# Patient Record
Sex: FEMALE | Race: WHITE | NOT HISPANIC OR LATINO | Employment: OTHER | URBAN - METROPOLITAN AREA
[De-identification: names, ages, dates, MRNs, and addresses within clinical notes are randomized per-mention and may not be internally consistent; named-entity substitution may affect disease eponyms.]

---

## 2017-03-27 ENCOUNTER — TELEPHONE (OUTPATIENT)
Dept: NEUROSURGERY | Facility: CLINIC | Age: 52
End: 2017-03-27

## 2017-03-27 NOTE — TELEPHONE ENCOUNTER
MINERVA PT, WILL OBTAIN CD OF IMAGES AND SHE HAS THE WRITTEN REPORT. APPT MADE FOR NEXT MONDAY. APPT SLIP INT HE MAIL.

## 2017-03-27 NOTE — TELEPHONE ENCOUNTER
----- Message from Dalia Hoang sent at 3/27/2017  9:04 AM CDT -----  Contact: self  Patient request Dr Avalos patient states has brain tumor. Need appointment for this week   Please call pt at [phone]

## 2017-04-03 ENCOUNTER — OFFICE VISIT (OUTPATIENT)
Dept: NEUROSURGERY | Facility: CLINIC | Age: 52
End: 2017-04-03
Payer: COMMERCIAL

## 2017-04-03 VITALS
DIASTOLIC BLOOD PRESSURE: 78 MMHG | WEIGHT: 178.19 LBS | HEART RATE: 90 BPM | TEMPERATURE: 99 F | BODY MASS INDEX: 32.79 KG/M2 | HEIGHT: 62 IN | SYSTOLIC BLOOD PRESSURE: 115 MMHG

## 2017-04-03 DIAGNOSIS — D32.9 MENINGIOMA: Primary | ICD-10-CM

## 2017-04-03 PROCEDURE — 99204 OFFICE O/P NEW MOD 45 MIN: CPT | Mod: S$GLB,,, | Performed by: NEUROLOGICAL SURGERY

## 2017-04-03 PROCEDURE — 99999 PR PBB SHADOW E&M-EST. PATIENT-LVL III: CPT | Mod: PBBFAC,,, | Performed by: NEUROLOGICAL SURGERY

## 2017-04-03 PROCEDURE — 1160F RVW MEDS BY RX/DR IN RCRD: CPT | Mod: S$GLB,,, | Performed by: NEUROLOGICAL SURGERY

## 2017-04-03 RX ORDER — EFINACONAZOLE 100 MG/ML
SOLUTION TOPICAL
Refills: 10 | COMMUNITY
Start: 2017-01-31

## 2017-04-03 NOTE — LETTER
April 3, 2017      Helga Duvall MD  [address]  [facility] [address]           [facility] - Neurosurgery 7th Fl  [address]  [facility] [address]  Phone: [phone]          Patient: Matt Prado   MR Number: [id number]   YOB: 1965   Date of Visit: 4/3/2017       Dear Dr. Helga Duvall:    Thank you for referring Matt Prado to me for evaluation. Attached you will find relevant portions of my assessment and plan of care.    If you have questions, please do not hesitate to call me. I look forward to following Matt Prado along with you.    Sincerely,    Kedar Avalos MD    Enclosure  CC:  No Recipients    If you would like to receive this communication electronically, please contact [email or web address] or [phone] to request more information on [facility] access.    For providers and/or their staff who would like to refer a patient to [facility], please contact us through our one-stop-shop provider referral line, [facility], at [phone].    If you feel you have received this communication in error or would no longer like to receive these types of communications, please e-mail [email or web address]

## 2017-04-03 NOTE — PROGRESS NOTES
This office note has been dictated.  Matt Prado was seen in neurosurgical consultation at the office this   morning.  She is a 51-year-old lady who developed severe headache associated   with some fever and malaise last month.  She was seen in medical consultation by   Dr. Wahl in [place] and as part of her evaluation, MRI of the brain was   ordered showing a small brain tumor and she was referred for neurosurgical   evaluation.  The headaches have resolved and at this point, she has no specific   neurological complaints.  She feels that her eyesight and hearing are good.  She   has had no difficulty speaking, no weakness or numbness in extremities,   difficulty with gait or balance.  No seizures or syncopal episodes.  Past   medical history is generally benign.  She has a past history of gastroesophageal   reflux.  She is an .  She has had some issues with her practice over   the last two years and this has put extra stress on her and had her anxious and   depressed. Review of systems is otherwise negative.    On physical examination, she is a well-developed, somewhat obese white lady who   is alert and cooperative.  Examination of the head shows no tenderness over the   scalp.  Eyes show full extraocular movements.  There is no nystagmus.  Pupils   are equal and reactive to light.  Fundi show clear disk margins.  Hearing is   good bilaterally.  The neck is supple.  On neurological examination, she is   speaking clearly, provides a good history.  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  Finger-to-nose was done   well and gait is unremarkable.  Cranial nerves are otherwise intact.  She has   normal facial sensation and movement.  The tongue protrudes in the midline.  She   shows good strength in extremities, normal sensation and brisk, symmetrical   deep tendon reflexes.    MRI of the brain was done at [facility] on 03/13/17.  Ventricular size is normal.  The    brain is normally formed.  There is a small approximately 1 cm enhancing mass   arising from the dura in the central sulcus area.  This slightly depresses the   brain.  There is no associated edema.  The brain is otherwise unremarkable.    IMPRESSION:  Left parietal convexity meningioma.    RECOMMENDATIONS:  The tumor is quite small and asymptomatic.  This appears quite   benign, but the growth potential is unclear.  I would recommend we have a   followup MRI done in about one year.  If the tumor is showing any growth,   consideration can be given to excision.  I will plan to see her back then.      BRENT/CARLITOS  dd: 04/03/2017 10:29:41 (CDT)  td: 04/04/2017 01:21:49 (CDT)  Doc ID   #[id number]  Job ID #[id number]    CC: Matt Prado

## 2017-04-03 NOTE — MR AVS SNAPSHOT
[facility] - Neurosurgery 7th Fl  [address]  [place] [address]  Phone: [phone]                  Matt Prado   4/3/2017 9:45 AM   Office Visit    Description:  Female : 1965   Provider:  Kedar Avalos MD   Department:  [facility] - Neurosurgery 7th Fl           Diagnoses this Visit        Comments    Meningioma    -  Primary            To Do List           Goals (5 Years of Data)     None      Follow-Up and Disposition     Return in about 1 year (around 4/3/2018) for MRI brain.      [facility]     [facility] Nurse Care Line -  Assistance  Unless otherwise directed by your provider, please contact [facility], our nurse care line that is available for  assistance.     Registered nurses in the [facility] provide: appointment scheduling, clinical advisement, health education, and other advisory services.  Call: [phone] (toll free)               Medications           Message regarding Medications     Verify the changes and/or additions to your medication regime listed below are the same as discussed with your clinician today.  If any of these changes or additions are incorrect, please notify your healthcare provider.        STOP taking these medications     peg-electrolyte soln (TRILYTE WITH FLAVOR PACKETS) 420 gram SolR Take 4,000 mLs by mouth as directed.           Verify that the below list of medications is an accurate representation of the medications you are currently taking.  If none reported, the list may be blank. If incorrect, please contact your healthcare provider. Carry this list with you in case of emergency.           Current Medications     biotin 10 mg Tab Take by mouth.    cetirizine (ZYRTEC) 10 MG tablet Take 10 mg by mouth daily as needed.      JUBLIA 10 % Macario APPLY TO NAILS DAILY    L.acidoph-L.rhamn-B.bif-B.long 12.9 mg (2 billion cell) TbEC Take by mouth.    omeprazole (PRILOSEC) 40 MG capsule TAKE 1 CAPSULE(40 MG) BY  "MOUTH EVERY DAY    vitamin D 1000 units Tab Take 1,000 mg by mouth once daily.           Clinical Reference Information           Your Vitals Were     BP Pulse Temp Height Weight Last Period    115/78 90 98.6 °F (37 °C) (Oral) 5' 2" (1.575 m) 80.8 kg (178 lb 3.2 oz) 03/24/2017    BMI                32.59 kg/m2          Blood Pressure          Most Recent Value    BP  115/78      Allergies as of 4/3/2017     Percocet [Oxycodone-acetaminophen]    Erythromycin      Immunizations Administered on Date of Encounter - 4/3/2017     None      Orders Placed During Today's Visit     Future Labs/Procedures Expected by Expires    MRI Brain W WO Contrast  4/3/2018 4/3/2018      Language Assistance Services     ATTENTION: Language assistance services are available, free of charge. Please call [phone].      ATENCIÓN: Si adelaide astudillo, tiene a osborn disposición servicios gratuitos de asistencia lingüística. Llame al [phone].     CHÚ Ý: N?u b?n nói Ti?ng Vi?t, có các d?ch v? h? tr? ngôn ng? mi?n phí dành cho b?n. G?i s? [phone].         [facility] [address] complies with applicable Federal civil rights laws and does not discriminate on the basis of race, color, national origin, age, disability, or sex.        "

## 2018-01-26 ENCOUNTER — TELEPHONE (OUTPATIENT)
Dept: NEUROSURGERY | Facility: CLINIC | Age: 53
End: 2018-01-26

## 2018-01-26 NOTE — TELEPHONE ENCOUNTER
----- Message from Deonna Jolley sent at 1/26/2018 11:40 AM CST -----  Contact: Pt. [phone]  The patient would like to speak to someone regarding scheduling her recall appointment and MRI. Please contact the patient to discuss further.

## 2018-04-09 ENCOUNTER — OFFICE VISIT (OUTPATIENT)
Dept: NEUROSURGERY | Facility: CLINIC | Age: 53
End: 2018-04-09
Payer: COMMERCIAL

## 2018-04-09 ENCOUNTER — HOSPITAL ENCOUNTER (OUTPATIENT)
Dept: RADIOLOGY | Facility: HOSPITAL | Age: 53
Discharge: HOME | End: 2018-04-09
Attending: NEUROLOGICAL SURGERY
Payer: COMMERCIAL

## 2018-04-09 VITALS
HEIGHT: 62 IN | SYSTOLIC BLOOD PRESSURE: 145 MMHG | BODY MASS INDEX: 32.76 KG/M2 | WEIGHT: 178 LBS | HEART RATE: 94 BPM | DIASTOLIC BLOOD PRESSURE: 96 MMHG

## 2018-04-09 DIAGNOSIS — D32.9 MENINGIOMA: ICD-10-CM

## 2018-04-09 DIAGNOSIS — D32.9 MENINGIOMA: Primary | ICD-10-CM

## 2018-04-09 PROCEDURE — 70553 MRI BRAIN STEM W/O & W/DYE: CPT | Mod: 26,,, | Performed by: RADIOLOGY

## 2018-04-09 PROCEDURE — 70553 MRI BRAIN STEM W/O & W/DYE: CPT | Mod: TC

## 2018-04-09 PROCEDURE — A9585 GADOBUTROL INJECTION: HCPCS | Performed by: NEUROLOGICAL SURGERY

## 2018-04-09 PROCEDURE — 99999 PR PBB SHADOW E&M-EST. PATIENT-LVL III: CPT | Mod: PBBFAC,,, | Performed by: NEUROLOGICAL SURGERY

## 2018-04-09 PROCEDURE — 25500020 PHARM REV CODE 255: Performed by: NEUROLOGICAL SURGERY

## 2018-04-09 PROCEDURE — 99213 OFFICE O/P EST LOW 20 MIN: CPT | Mod: S$GLB,,, | Performed by: NEUROLOGICAL SURGERY

## 2018-04-09 RX ORDER — GADOBUTROL 604.72 MG/ML
8 INJECTION INTRAVENOUS
Status: COMPLETED | OUTPATIENT
Start: 2018-04-09 | End: 2018-04-09

## 2018-04-09 RX ADMIN — GADOBUTROL 8 ML: 604.72 INJECTION INTRAVENOUS at 01:04

## 2018-04-09 NOTE — PROGRESS NOTES
This office note has been dictated.  Matt Prado returned in neurosurgical followup to the office today.  She has   had occasional headaches over the past year, but nothing as severe as the   original headache that led to MRI of the brain being done.  She has otherwise   been doing well neurologically.  She has had no visual complaints, no difficulty   speaking or swallowing, no weakness or numbness in extremities, difficulty with   gait or balance.  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    On brief examination today, she is bright and alert and speaking clearly.  She   has normal extraocular movements.  The pupils are equal.  She arose easily from   the chair and walked and shows no focal neurological deficit.    MRI of the brain was repeated at [facility] today and compared to the study   done at [facility] on 03/13/17.  Again, is noted a slightly less than 1 cm convexity   meningioma near the central sulcus.  It just does touch the brain without   deformity.  There is no cerebral edema or complicating factors.  The brain is   otherwise unremarkable.    This small tumor seems quite indolent.  I believe we can wait two years before   doing a followup MRI to monitor it.  I will see her back with her study.      BRENT/CARLITOS  dd: 04/09/2018 15:15:48 (CDT)  td: 04/10/2018 11:45:03 (CDT)  Doc ID   #[id number]  Job ID #[id number]    CC: Matt Prado

## 2018-07-26 ENCOUNTER — TELEPHONE (OUTPATIENT)
Dept: ORTHOPEDICS | Facility: CLINIC | Age: 53
End: 2018-07-26

## 2018-07-26 NOTE — TELEPHONE ENCOUNTER
----- Message from Cheryl Bowling sent at 7/26/2018  8:05 AM CDT -----  Name of Who is Calling:ISAAK MCDANIELS [[id number]]      What is the request in detail: Pt has a possible broken bone in left hand and needs to be seen today.      Can the clinic reply by [facility]:   No       [facility]: [phone]

## 2018-07-26 NOTE — TELEPHONE ENCOUNTER
LM informing nothing available at this time, may want to go to urgent care, get x-rays and splint if needed.

## 2018-08-27 ENCOUNTER — OFFICE VISIT (OUTPATIENT)
Dept: OBSTETRICS AND GYNECOLOGY | Facility: CLINIC | Age: 53
End: 2018-08-27
Attending: OBSTETRICS & GYNECOLOGY
Payer: COMMERCIAL

## 2018-08-27 VITALS
DIASTOLIC BLOOD PRESSURE: 88 MMHG | HEIGHT: 62 IN | BODY MASS INDEX: 33.95 KG/M2 | SYSTOLIC BLOOD PRESSURE: 134 MMHG | WEIGHT: 184.5 LBS

## 2018-08-27 DIAGNOSIS — N95.1 PERIMENOPAUSAL: ICD-10-CM

## 2018-08-27 DIAGNOSIS — Z12.31 VISIT FOR SCREENING MAMMOGRAM: ICD-10-CM

## 2018-08-27 DIAGNOSIS — Z12.4 PAP SMEAR FOR CERVICAL CANCER SCREENING: ICD-10-CM

## 2018-08-27 DIAGNOSIS — N93.9 ABNORMAL UTERINE BLEEDING (AUB): ICD-10-CM

## 2018-08-27 DIAGNOSIS — Z01.419 WELL WOMAN EXAM WITH ROUTINE GYNECOLOGICAL EXAM: Primary | ICD-10-CM

## 2018-08-27 PROCEDURE — 99999 PR PBB SHADOW E&M-EST. PATIENT-LVL III: CPT | Mod: PBBFAC,,, | Performed by: OBSTETRICS & GYNECOLOGY

## 2018-08-27 PROCEDURE — 99396 PREV VISIT EST AGE 40-64: CPT | Mod: S$GLB,,, | Performed by: OBSTETRICS & GYNECOLOGY

## 2018-08-27 PROCEDURE — 99212 OFFICE O/P EST SF 10 MIN: CPT | Mod: 25,S$GLB,, | Performed by: OBSTETRICS & GYNECOLOGY

## 2018-08-27 PROCEDURE — 88175 CYTOPATH C/V AUTO FLUID REDO: CPT

## 2018-08-27 NOTE — PROGRESS NOTES
Matt Prado is a 53 y.o. female  who presents for annual exam.  Patient's last menstrual period was 2018 (approximate).      Past Medical History:   Diagnosis Date    Allergy     Family history of melanoma 2014    Former light tobacco smoker: < 5 per day x 10 years 2016    GERD (gastroesophageal reflux disease)     Helicobacter pylori ab+ treated 2014    Situational stress 2014    Smoker: trivial 2015       Past Surgical History:   Procedure Laterality Date    CHOLECYSTECTOMY         OB History      Para Term  AB Living    3 3       3    SAB TAB Ectopic Multiple Live Births                       ROS:  GENERAL: Feeling well overall.   SKIN: Denies rash or lesions.   HEAD: Denies head injury or headache.   NODES: Denies enlarged lymph nodes.   CHEST: Denies chest pain or shortness of breath.   CARDIOVASCULAR: Denies palpitations or left sided chest pain.   ABDOMEN: No abdominal pain, nausea, vomiting or rectal bleeding.   URINARY: No dysuria or hematuria.  REPRODUCTIVE: See HPI.   BREASTS: Denies pain, lumps, or nipple discharge.   HEMATOLOGIC: Reports excessive bleeding.   MUSCULOSKELETAL: Denies joint pain or swelling.   NEUROLOGIC: Denies syncope or weakness.   PSYCHIATRIC: Denies depression.    PE:   (chaperone present during entire exam)  APPEARANCE: Well nourished, well developed, in no acute distress.  BREASTS: Symmetrical, no skin changes or visible lesions. No palpable masses, nipple discharge or adenopathy bilaterally.  ABDOMEN: Soft. No tenderness or masses. No hernias. No CVA tenderness.  VULVA: No lesions. Normal female genitalia.  URETHRAL MEATUS: Normal size and location, no lesions, no prolapse.  URETHRA: No masses, tenderness, prolapse or scarring.  VAGINA: No lesions, no discharge, no significant cystocele or rectocele.  CERVIX: No lesions and discharge. PAP done.  UTERUS: Normal size, regular shape, mobile, non-tender, bladder base  nontender.  ADNEXA: No masses, tenderness or CDS nodularity.  ANUS PERINEUM: Normal.      Diagnosis:  1. Well woman exam with routine gynecological exam    2. Pap smear for cervical cancer screening    3. Perimenopausal    4. Visit for screening mammogram    5. Abnormal uterine bleeding (AUB)          PLAN:    Orders Placed This Encounter    Mammo Digital Screening Bilat with Tomos    US Pelvis Comp with Transvag NON-OB (xpd    Liquid-based pap smear, screening       Patient was counseled today on the need for annual gyn exams.    Follow-up in 1 year.    CC:  Abnormal bleeding    HPI:  In additional to her annual exam, she also reports that, for the past 6 months, she has noted significant changes in her menses with episodes of irregular, frequent, and prolonged bleeding.  No pelvic pain.  No fever.   We are occurring every 2-4 weeks with bleeding lasting from 3 days to 14 days.    ROS:  ABDOMEN: No abdominal pain, nausea, vomiting or rectal bleeding.   URINARY: No dysuria or hematuria.  REPRODUCTIVE: See HPI.   HEMATOLOGIC: Reports excessive bleeding.     PE:  ABDOMEN: Soft. No tenderness or masses.   VULVA: No lesions. Normal female genitalia.  VAGINA: Moist and well rugated, no abnormal discharge.  CERVIX: No lesions or discharge.   UTERUS: Normal size, regular shape, mobile, non-tender.  ADNEXA: No masses, tenderness, or CDS nodularity.    IMP:  1) Abnormal uterine bleeding  2) Perimenopausal    We discussed hypermenorrhea and the need for evaluation with pelvic ultrasound and EMBX.  We also discussed various treatment options: medical vs surgical.    PLAN:  1) Pelvic ultrasound  2) Return for EMBX

## 2018-08-28 ENCOUNTER — HOSPITAL ENCOUNTER (OUTPATIENT)
Dept: RADIOLOGY | Facility: OTHER | Age: 53
Discharge: HOME | End: 2018-08-28
Attending: OBSTETRICS & GYNECOLOGY
Payer: COMMERCIAL

## 2018-08-28 ENCOUNTER — PATIENT MESSAGE (OUTPATIENT)
Dept: OBSTETRICS AND GYNECOLOGY | Facility: CLINIC | Age: 53
End: 2018-08-28

## 2018-08-28 VITALS — BODY MASS INDEX: 33.86 KG/M2 | HEIGHT: 62 IN | WEIGHT: 184 LBS

## 2018-08-28 DIAGNOSIS — Z12.31 VISIT FOR SCREENING MAMMOGRAM: ICD-10-CM

## 2018-08-28 DIAGNOSIS — N93.9 ABNORMAL UTERINE BLEEDING (AUB): ICD-10-CM

## 2018-08-28 PROCEDURE — 76856 US EXAM PELVIC COMPLETE: CPT | Mod: 26,,, | Performed by: RADIOLOGY

## 2018-08-28 PROCEDURE — 76830 TRANSVAGINAL US NON-OB: CPT | Mod: 26,,, | Performed by: RADIOLOGY

## 2018-08-28 PROCEDURE — 76856 US EXAM PELVIC COMPLETE: CPT | Mod: TC

## 2018-08-28 PROCEDURE — 77067 SCR MAMMO BI INCL CAD: CPT | Mod: 26,,, | Performed by: RADIOLOGY

## 2018-08-28 PROCEDURE — 77063 BREAST TOMOSYNTHESIS BI: CPT | Mod: 26,,, | Performed by: RADIOLOGY

## 2018-08-28 PROCEDURE — 77063 BREAST TOMOSYNTHESIS BI: CPT | Mod: TC

## 2018-08-29 ENCOUNTER — PATIENT MESSAGE (OUTPATIENT)
Dept: OBSTETRICS AND GYNECOLOGY | Facility: CLINIC | Age: 53
End: 2018-08-29

## 2018-08-30 ENCOUNTER — PATIENT MESSAGE (OUTPATIENT)
Dept: OBSTETRICS AND GYNECOLOGY | Facility: CLINIC | Age: 53
End: 2018-08-30

## 2018-08-30 ENCOUNTER — TELEPHONE (OUTPATIENT)
Dept: OBSTETRICS AND GYNECOLOGY | Facility: CLINIC | Age: 53
End: 2018-08-30

## 2018-08-30 NOTE — TELEPHONE ENCOUNTER
Called patient:    Discussed results of pelvic ultrasound.    FINDINGS:  Uterus:  Measures 11.2 x 7.2 x 7.9 cm in transverse dimensions.  The endometrium is normal thickness measuring 8 mm.   Three uterine masses noted, suggesting fibroids.  Anterior body fibroid measuring 2.2 cm, inferior left body fibroid measuring 3.1 cm, posterior body fibroid measuring 3.8 cm (slightly distorting endometrium)..    Ovaries:  The ovaries are normal in size.  The right ovary measures 3.2 x 1.7 x 2.7 cm.  The left ovary measures  2.5 x 1.7 x 3.4 cm.  Doppler flow demonstrated to both ovaries.  Free Fluid:  None.      Impression     Mildly enlarged uterus containing multiple fibroids.       She is scheduled for EMBX 9/24/18.

## 2018-08-30 NOTE — TELEPHONE ENCOUNTER
Called patient:    Message left to call us.    [Uterus with fibroids- largest 3.8 cm.  She is scheduled for EMBX 9/24/18]

## 2018-08-31 ENCOUNTER — PATIENT MESSAGE (OUTPATIENT)
Dept: OBSTETRICS AND GYNECOLOGY | Facility: CLINIC | Age: 53
End: 2018-08-31

## 2018-09-23 NOTE — PROGRESS NOTES
CC: ENDOMETRIAL BIOPSPY    Matt Prado is a 53 y.o. female  presents for an endometrial biopsy secondary to abnormal bleeding.  For past 6 months, she has noted significant changes in her menses with episodes of irregular, frequent, and prolonged bleeding.  No pelvic pain.  No fever.   We are occurring every 2-4 weeks with bleeding lasting from 3 days to 14 days.       Pelvic sono 18:  FINDINGS:  Uterus:  Measures 11.2 x 7.2 x 7.9 cm in transverse dimensions.  The endometrium is normal thickness measuring 8 mm.   Three uterine masses noted, suggesting fibroids.  Anterior body fibroid measuring 2.2 cm, inferior left body fibroid measuring 3.1 cm, posterior body fibroid measuring 3.8 cm (slightly distorting endometrium)..  Ovaries:  The ovaries are normal in size.  The right ovary measures 3.2 x 1.7 x 2.7 cm.  The left ovary measures 2.5 x 1.7 x 3.4 cm.  Doppler flow demonstrated to both ovaries.  Free Fluid:  None.      Impression     Mildly enlarged uterus containing multiple fibroids.     Pap  18: Negative    UPT is negative    PRE ENDOMETRIAL BIOPSY COUNSELING:  The patient was informed of the risk of bleeding, infection, uterine perforation and pain and that the test will rule-out endometrial cancer with accuracy greater than 95%. She was counseled on the alternatives to endometrial biopsy and agrees to proceed.    TIME OUT PERFORMED.  The cervix was visualized with a speculum and prepped with betadine  A sterile endometrial pipelle was passed without difficulty to a depth of {NUMBERS:58808} cm.  *** endometrial tissue was obtained.  The specimen was placed in formalyn and sent to Pathology for histology evaluation. The patient tolerated the procedure well.    ASSESSMENT and PLAN    1. Abnormal uterine bleeding (AUB)          POST ENDOMETRIAL BIOPSY COUNSELING:  Manage post biopsy cramping with NSAIDs or Tylenol.  Expect spotting or light bleeding for a few days.  Report bleeding heavier  than a period, fever > 101.0 F, worsening pain or a foul smelling vaginal discharge.    FOLLOW-UP: Pending biopsy results.  We will contact her in several days to discuss biopsy results and treatment plan.

## 2018-09-24 ENCOUNTER — PROCEDURE VISIT (OUTPATIENT)
Dept: OBSTETRICS AND GYNECOLOGY | Facility: CLINIC | Age: 53
End: 2018-09-24
Attending: OBSTETRICS & GYNECOLOGY
Payer: COMMERCIAL

## 2018-09-24 VITALS
SYSTOLIC BLOOD PRESSURE: 135 MMHG | WEIGHT: 187.75 LBS | DIASTOLIC BLOOD PRESSURE: 90 MMHG | BODY MASS INDEX: 34.33 KG/M2

## 2018-09-24 DIAGNOSIS — N93.9 ABNORMAL UTERINE BLEEDING (AUB): Primary | ICD-10-CM

## 2018-09-24 DIAGNOSIS — D25.1 FIBROIDS, INTRAMURAL: ICD-10-CM

## 2018-09-24 DIAGNOSIS — N95.1 PERIMENOPAUSAL: ICD-10-CM

## 2018-09-24 PROCEDURE — 88305 TISSUE EXAM BY PATHOLOGIST: CPT | Performed by: PATHOLOGY

## 2018-09-24 PROCEDURE — 58100 BIOPSY OF UTERUS LINING: CPT | Mod: S$GLB,,, | Performed by: OBSTETRICS & GYNECOLOGY

## 2018-09-24 PROCEDURE — 88305 TISSUE EXAM BY PATHOLOGIST: CPT | Mod: 26,,, | Performed by: PATHOLOGY

## 2018-09-24 PROCEDURE — 81025 URINE PREGNANCY TEST: CPT | Mod: S$GLB,,, | Performed by: OBSTETRICS & GYNECOLOGY

## 2018-09-24 NOTE — PROCEDURES
CC: ENDOMETRIAL BIOPSPY     Matt Prado is a 53 y.o. female  presents for an endometrial biopsy secondary to abnormal bleeding.  For past 6 months, she has noted significant changes in her menses with episodes of irregular, frequent, and prolonged bleeding.  No pelvic pain.  No fever.  Now, menses are occurring every 2-4 weeks with bleeding lasting from 3 days to 14 days.  Recent pelvic sono showed several fibroids, largest 3.8 cm.  We discussed the need for EMBX to rule out endometrial pathology.     Pelvic sono 18:       FINDINGS:  Uterus:  Measures 11.2 x 7.2 x 7.9 cm in transverse dimensions.  The endometrium is normal thickness measuring 8 mm.   Three uterine masses noted, suggesting fibroids.  Anterior body fibroid measuring 2.2 cm, inferior left body fibroid measuring 3.1 cm, posterior body fibroid measuring 3.8 cm (slightly distorting endometrium)..  Ovaries:  The ovaries are normal in size.  The right ovary measures 3.2 x 1.7 x 2.7 cm.  The left ovary measures 2.5 x 1.7 x 3.4 cm.  Doppler flow demonstrated to both ovaries.  Free Fluid:  None.       Impression       Mildly enlarged uterus containing multiple fibroids.      Pap  18: Negative     UPT is negative     PRE ENDOMETRIAL BIOPSY COUNSELING:  The patient was informed of the risk of bleeding, infection, uterine perforation and pain and that the test will rule-out endometrial cancer with accuracy greater than 95%. She was counseled on the alternatives to endometrial biopsy and agrees to proceed.    TIME OUT PERFORMED.  The cervix was visualized with a speculum and prepped with betadine  A sterile endometrial pipelle was passed without difficulty to a depth of 11 cm.  Endometrial tissue was obtained.  The specimen was placed in formalyn and sent to Pathology for histology evaluation.   The patient tolerated the procedure well.      ASSESSMENT and PLAN    1. Abnormal uterine bleeding (AUB)    2.    Fibroids  3.     Perimenopausal       POST ENDOMETRIAL BIOPSY COUNSELING:  Manage post biopsy cramping with NSAIDs or Tylenol.  Expect spotting or light bleeding for a few days.  Report bleeding heavier than a period, fever > 101.0 F, worsening pain or a foul smelling vaginal discharge.    We discussed the various treatment options for abnormal bleedin) no treatment  2) medical options  3) surgical procedures.      FOLLOW-UP: Pending biopsy results.  We will contact her in several days to discuss biopsy results and treatment plan.

## 2018-10-01 ENCOUNTER — TELEPHONE (OUTPATIENT)
Dept: OBSTETRICS AND GYNECOLOGY | Facility: CLINIC | Age: 53
End: 2018-10-01

## 2018-10-01 NOTE — TELEPHONE ENCOUNTER
Called patient:    Message left to call us.    [EMBX negative - she needs to call us to discuss treatment options.]

## 2018-10-02 NOTE — TELEPHONE ENCOUNTER
Called patient:    Discussed results of EMBX from 9/24/18:    FINAL PATHOLOGIC DIAGNOSIS  Endometrial biopsy:  Dyssynchronous endometrium with breakdown;  Negative for hyperplasia or malignancy.    We reviewed the treatment options for hypermenorrhea and fibroids: We reviewed the various treatment options:  1) no treatment  2) medical treatment (OCPs, Provera, Depo-Provera,  Mirena IUD, Lysteda)  3) Radiologic embolization  4) Surgical treatment: D&C, hysteroscopy, endometrial ablation, myomectomy, hysterectomy.    She will consider her options and let us know.

## 2020-02-12 ENCOUNTER — TELEPHONE (OUTPATIENT)
Dept: NEUROSURGERY | Facility: CLINIC | Age: 55
End: 2020-02-12

## 2020-02-12 DIAGNOSIS — D32.9 MENINGIOMA: Primary | ICD-10-CM

## 2020-03-31 ENCOUNTER — TELEPHONE (OUTPATIENT)
Dept: NEUROSURGERY | Facility: CLINIC | Age: 55
End: 2020-03-31

## 2020-03-31 NOTE — TELEPHONE ENCOUNTER
----- Message from Jelena Barriga sent at 3/31/2020 11:08 AM CDT -----  Contact: pt @ [phone]  Asking to speak with someone in Dr. Avalos's office, says she was speaking with CASSIE and the call kept dropping. Please call.

## 2020-03-31 NOTE — TELEPHONE ENCOUNTER
----- Message from Grey Pereira sent at 3/31/2020 10:52 AM CDT -----  Contact: pt @ [phone]  Pt states she's returning your call

## 2020-04-06 ENCOUNTER — HOSPITAL ENCOUNTER (OUTPATIENT)
Dept: RADIOLOGY | Facility: HOSPITAL | Age: 55
Discharge: HOME | End: 2020-04-06
Attending: NEUROLOGICAL SURGERY
Payer: COMMERCIAL

## 2020-04-06 ENCOUNTER — OFFICE VISIT (OUTPATIENT)
Dept: NEUROSURGERY | Facility: CLINIC | Age: 55
End: 2020-04-06
Payer: COMMERCIAL

## 2020-04-06 DIAGNOSIS — D32.9 MENINGIOMA: ICD-10-CM

## 2020-04-06 DIAGNOSIS — D32.9 MENINGIOMA: Primary | ICD-10-CM

## 2020-04-06 PROCEDURE — 25500020 PHARM REV CODE 255: Performed by: NEUROLOGICAL SURGERY

## 2020-04-06 PROCEDURE — A9585 GADOBUTROL INJECTION: HCPCS | Performed by: NEUROLOGICAL SURGERY

## 2020-04-06 PROCEDURE — 70553 MRI BRAIN STEM W/O & W/DYE: CPT | Mod: TC

## 2020-04-06 PROCEDURE — 99213 OFFICE O/P EST LOW 20 MIN: CPT | Mod: 95,,, | Performed by: NEUROLOGICAL SURGERY

## 2020-04-06 PROCEDURE — 70553 MRI BRAIN STEM W/O & W/DYE: CPT | Mod: 26,,, | Performed by: RADIOLOGY

## 2020-04-06 PROCEDURE — 70553 MRI BRAIN W WO CONTRAST: ICD-10-PCS | Mod: 26,,, | Performed by: RADIOLOGY

## 2020-04-06 PROCEDURE — 99213 PR OFFICE/OUTPT VISIT, EST, LEVL III, 20-29 MIN: ICD-10-PCS | Mod: 95,,, | Performed by: NEUROLOGICAL SURGERY

## 2020-04-06 RX ORDER — GADOBUTROL 604.72 MG/ML
9 INJECTION INTRAVENOUS
Status: COMPLETED | OUTPATIENT
Start: 2020-04-06 | End: 2020-04-06

## 2020-04-06 RX ADMIN — GADOBUTROL 9 ML: 604.72 INJECTION INTRAVENOUS at 09:04

## 2020-04-06 NOTE — PROGRESS NOTES
Matt Prado was seen in neurosurgical follow-up by virtual visit this morning.  She has been doing well over the past 2 years with no new complaints.  Vision and speech have remained stable.  She has had no seizures or headaches.  She was specifically asked of any weakness or clumsiness in the right arm and hand as the small meningioma is adjacent to the left motor strip.  She has had no symptoms of this type.    Examination by virtual visit was limited.  She is alert and cooperative and speaking clearly.  She had no difficulty holding her arms out, there was no evidence for any weakness or pronator drift.    MRI of the brain was repeated at [facility] today and compared to her previous study of 04/09/2018.  Again is seen the small left convexity meningioma which just does the touch the left precentral gyrus.  There is no evidence for edema or reaction in the brain.  The tumor is the same size on both studies.    This has proved to be a quite stable lesion.  We will plan to have follow-up MRI done in about 2 years.  If she develops any new symptoms prior to that time she should call the office.

## 2020-05-08 ENCOUNTER — HOSPITAL ENCOUNTER (EMERGENCY)
Facility: HOSPITAL | Age: 55
Discharge: HOME | End: 2020-05-08
Attending: EMERGENCY MEDICINE
Payer: COMMERCIAL

## 2020-05-08 VITALS
WEIGHT: 165 LBS | BODY MASS INDEX: 29.23 KG/M2 | HEIGHT: 63 IN | TEMPERATURE: 98 F | SYSTOLIC BLOOD PRESSURE: 121 MMHG | RESPIRATION RATE: 14 BRPM | HEART RATE: 88 BPM | DIASTOLIC BLOOD PRESSURE: 77 MMHG | OXYGEN SATURATION: 98 %

## 2020-05-08 DIAGNOSIS — R11.2 NON-INTRACTABLE VOMITING WITH NAUSEA, UNSPECIFIED VOMITING TYPE: ICD-10-CM

## 2020-05-08 DIAGNOSIS — R53.1 WEAKNESS: Primary | ICD-10-CM

## 2020-05-08 DIAGNOSIS — D72.829 LEUKOCYTOSIS: ICD-10-CM

## 2020-05-08 LAB
ALBUMIN SERPL BCP-MCNC: 4.1 G/DL (ref 3.5–5.2)
ALP SERPL-CCNC: 69 U/L (ref 55–135)
ALT SERPL W/O P-5'-P-CCNC: 22 U/L (ref 10–44)
ANION GAP SERPL CALC-SCNC: 9 MMOL/L (ref 8–16)
AST SERPL-CCNC: 20 U/L (ref 10–40)
BACTERIA #/AREA URNS HPF: NORMAL /HPF
BASOPHILS # BLD AUTO: 0.03 K/UL (ref 0–0.2)
BASOPHILS NFR BLD: 0.2 % (ref 0–1.9)
BILIRUB SERPL-MCNC: 0.7 MG/DL (ref 0.1–1)
BILIRUB UR QL STRIP: NEGATIVE
BUN SERPL-MCNC: 16 MG/DL (ref 6–20)
CALCIUM SERPL-MCNC: 9.3 MG/DL (ref 8.7–10.5)
CHLORIDE SERPL-SCNC: 101 MMOL/L (ref 95–110)
CLARITY UR: CLEAR
CO2 SERPL-SCNC: 27 MMOL/L (ref 23–29)
COLOR UR: YELLOW
CREAT SERPL-MCNC: 0.8 MG/DL (ref 0.5–1.4)
DIFFERENTIAL METHOD: ABNORMAL
EOSINOPHIL # BLD AUTO: 0.1 K/UL (ref 0–0.5)
EOSINOPHIL NFR BLD: 0.7 % (ref 0–8)
ERYTHROCYTE [DISTWIDTH] IN BLOOD BY AUTOMATED COUNT: 14.5 % (ref 11.5–14.5)
EST. GFR  (AFRICAN AMERICAN): >60 ML/MIN/1.73 M^2
EST. GFR  (NON AFRICAN AMERICAN): >60 ML/MIN/1.73 M^2
GLUCOSE SERPL-MCNC: 116 MG/DL (ref 70–110)
GLUCOSE UR QL STRIP: NEGATIVE
HCT VFR BLD AUTO: 40.5 % (ref 37–48.5)
HGB BLD-MCNC: 13 G/DL (ref 12–16)
HGB UR QL STRIP: NEGATIVE
HYALINE CASTS #/AREA URNS LPF: 0 /LPF
IMM GRANULOCYTES # BLD AUTO: 0.15 K/UL (ref 0–0.04)
IMM GRANULOCYTES NFR BLD AUTO: 0.9 % (ref 0–0.5)
KETONES UR QL STRIP: ABNORMAL
LACTATE SERPL-SCNC: 1.3 MMOL/L (ref 0.5–2.2)
LEUKOCYTE ESTERASE UR QL STRIP: ABNORMAL
LIPASE SERPL-CCNC: 17 U/L (ref 4–60)
LYMPHOCYTES # BLD AUTO: 0.7 K/UL (ref 1–4.8)
LYMPHOCYTES NFR BLD: 4.4 % (ref 18–48)
MCH RBC QN AUTO: 26.6 PG (ref 27–31)
MCHC RBC AUTO-ENTMCNC: 32.1 G/DL (ref 32–36)
MCV RBC AUTO: 83 FL (ref 82–98)
MICROSCOPIC COMMENT: NORMAL
MONOCYTES # BLD AUTO: 1.4 K/UL (ref 0.3–1)
MONOCYTES NFR BLD: 8.6 % (ref 4–15)
NEUTROPHILS # BLD AUTO: 14.2 K/UL (ref 1.8–7.7)
NEUTROPHILS NFR BLD: 85.2 % (ref 38–73)
NITRITE UR QL STRIP: NEGATIVE
NRBC BLD-RTO: 0 /100 WBC
PH UR STRIP: >8 [PH] (ref 5–8)
PLATELET # BLD AUTO: 199 K/UL (ref 150–350)
PMV BLD AUTO: 9.8 FL (ref 9.2–12.9)
POTASSIUM SERPL-SCNC: 3.5 MMOL/L (ref 3.5–5.1)
PROCALCITONIN SERPL IA-MCNC: 0.41 NG/ML
PROT SERPL-MCNC: 7.2 G/DL (ref 6–8.4)
PROT UR QL STRIP: ABNORMAL
RBC # BLD AUTO: 4.89 M/UL (ref 4–5.4)
RBC #/AREA URNS HPF: 0 /HPF (ref 0–4)
SARS-COV-2 RDRP RESP QL NAA+PROBE: NEGATIVE
SODIUM SERPL-SCNC: 137 MMOL/L (ref 136–145)
SP GR UR STRIP: 1.01 (ref 1–1.03)
SQUAMOUS #/AREA URNS HPF: 6 /HPF
URN SPEC COLLECT METH UR: ABNORMAL
UROBILINOGEN UR STRIP-ACNC: NEGATIVE EU/DL
WBC # BLD AUTO: 16.71 K/UL (ref 3.9–12.7)
WBC #/AREA URNS HPF: 2 /HPF (ref 0–5)

## 2020-05-08 PROCEDURE — 99284 EMERGENCY DEPT VISIT MOD MDM: CPT | Mod: 25

## 2020-05-08 PROCEDURE — 96361 HYDRATE IV INFUSION ADD-ON: CPT

## 2020-05-08 PROCEDURE — 80053 COMPREHEN METABOLIC PANEL: CPT

## 2020-05-08 PROCEDURE — 96374 THER/PROPH/DIAG INJ IV PUSH: CPT

## 2020-05-08 PROCEDURE — 83605 ASSAY OF LACTIC ACID: CPT

## 2020-05-08 PROCEDURE — 63600175 PHARM REV CODE 636 W HCPCS: Performed by: PHYSICIAN ASSISTANT

## 2020-05-08 PROCEDURE — 85025 COMPLETE CBC W/AUTO DIFF WBC: CPT

## 2020-05-08 PROCEDURE — 83690 ASSAY OF LIPASE: CPT

## 2020-05-08 PROCEDURE — 81000 URINALYSIS NONAUTO W/SCOPE: CPT

## 2020-05-08 PROCEDURE — U0002 COVID-19 LAB TEST NON-CDC: HCPCS

## 2020-05-08 PROCEDURE — 25000003 PHARM REV CODE 250: Performed by: PHYSICIAN ASSISTANT

## 2020-05-08 PROCEDURE — 87040 BLOOD CULTURE FOR BACTERIA: CPT | Mod: 59

## 2020-05-08 PROCEDURE — 84145 PROCALCITONIN (PCT): CPT

## 2020-05-08 RX ORDER — ONDANSETRON 2 MG/ML
4 INJECTION INTRAMUSCULAR; INTRAVENOUS
Status: COMPLETED | OUTPATIENT
Start: 2020-05-08 | End: 2020-05-08

## 2020-05-08 RX ORDER — ONDANSETRON 4 MG/1
4 TABLET, ORALLY DISINTEGRATING ORAL EVERY 6 HOURS PRN
Qty: 30 TABLET | Refills: 0 | Status: SHIPPED | OUTPATIENT
Start: 2020-05-08

## 2020-05-08 RX ADMIN — ONDANSETRON 4 MG: 2 INJECTION INTRAMUSCULAR; INTRAVENOUS at 11:05

## 2020-05-08 RX ADMIN — SODIUM CHLORIDE 500 ML: 0.9 INJECTION, SOLUTION INTRAVENOUS at 11:05

## 2020-05-08 NOTE — ED TRIAGE NOTES
Pt reports after going to [facility] testing site where she states she became weak and feeling nauseous. PT states she does not understand why she is feeling that way. PT states she has been having body aches, chills, nausea and vomiting. PT in no acute distress.

## 2020-05-08 NOTE — ED PROVIDER NOTES
Encounter Date: 5/8/2020       History     Chief Complaint   Patient presents with    Fatigue     pt was at the [facility] testing  site for n/v and became weak. pt was given 250cc saline en route. cbg was 123mg/dl     Matt Prado, a 54 y.o. female  has a past medical history of Allergy, Family history of melanoma (1/23/2014), Former light tobacco smoker: < 5 per day x 10 years (5/24/2016), GERD (gastroesophageal reflux disease), Helicobacter pylori ab+ treated 2007 (1/24/2014), Situational stress (1/24/2014), and Smoker: trivial (2/27/2015).     She presents to the ED evaluation of episode of weakness.  Patients states that she started with chills and subjective fever overnight; called on-call number and was instructed to go to [facility] testing site.  Continued with vomiting x 5 times today.  While standing in line at site, states that she felt weak and like she was going to pass out.  EMS called.  CBG was 123.  Patient states prior to episode, had taken Aleve and tylenol.  Denies abdominal pain or diarrhea.        The history is provided by the patient.     Review of patient's allergies indicates:   Allergen Reactions    Percocet [oxycodone-acetaminophen] Itching     C/o of vomiting and itching    Erythromycin Other (See Comments)     Stomach pain     Past Medical History:   Diagnosis Date    Allergy     Family history of melanoma 1/23/2014    Former light tobacco smoker: < 5 per day x 10 years 5/24/2016    GERD (gastroesophageal reflux disease)     Helicobacter pylori ab+ treated 2007 1/24/2014    Situational stress 1/24/2014    Smoker: trivial 2/27/2015     Past Surgical History:   Procedure Laterality Date    CHOLECYSTECTOMY      COLONOSCOPY N/A 9/13/2016    Procedure: COLONOSCOPY;  Surgeon: Dionisio Meier MD;  Location: [address]);  Service: Endoscopy;  Laterality: N/A;  combined order to be done with EGD      Family History   Problem Relation Age of Onset    Breast cancer Maternal  Grandmother     Cancer Maternal Grandmother     Cancer Father         melanoma    Multiple sclerosis Father     Cancer Paternal Grandmother     Cancer Mother         Skin cancer    Arthritis Mother     Skin cancer Mother     Heart disease Neg Hx     Colon cancer Neg Hx     Ovarian cancer Neg Hx     Diabetes Neg Hx     Hypertension Neg Hx      Social History     Tobacco Use    Smoking status: Former Smoker     Packs/day: 0.25     Years: 10.00     Pack years: 2.50     Types: Cigarettes     Last attempt to quit: 2016     Years since quittin.3    Smokeless tobacco: Never Used   Substance Use Topics    Alcohol use: Yes     Alcohol/week: 2.0 standard drinks     Types: 2 Glasses of wine per week     Comment: 2-4  glasses every night    Drug use: No     Review of Systems   Constitutional: Positive for chills and fever.   Respiratory: Negative for cough and shortness of breath.    Cardiovascular: Negative for chest pain.   Gastrointestinal: Positive for nausea and vomiting. Negative for abdominal pain and diarrhea.   Allergic/Immunologic: Negative for immunocompromised state.   Neurological: Positive for weakness.       Physical Exam     Initial Vitals   BP Pulse Resp Temp SpO2   20 1133 20 1133 20 1134 20 1134 20 1133   114/68 85 14 98.1 °F (36.7 °C) 98 %      MAP       --                Physical Exam    Nursing note and vitals reviewed.  Constitutional: She appears well-developed and well-nourished. She is not diaphoretic. No distress.   HENT:   Head: Normocephalic and atraumatic.   Right Ear: External ear normal.   Left Ear: External ear normal.   Nose: Nose normal.   Eyes: Conjunctivae and EOM are normal.   Neck: Normal range of motion.   Cardiovascular: Normal rate and regular rhythm.   Pulmonary/Chest: Breath sounds normal. No respiratory distress. She has no wheezes. She has no rhonchi. She has no rales.   Abdominal: Soft. Bowel sounds are normal. She exhibits no  distension. There is no tenderness. There is no rebound and no guarding.   Musculoskeletal: Normal range of motion.   Neurological: She is alert and oriented to person, place, and time.   Skin: Skin is warm and dry. Capillary refill takes less than 2 seconds. No rash noted.   Psychiatric: She has a normal mood and affect. Thought content normal.         ED Course   Procedures  Labs Reviewed   CBC W/ AUTO DIFFERENTIAL   COMPREHENSIVE METABOLIC PANEL   LIPASE   URINALYSIS, REFLEX TO URINE CULTURE   SARS-COV-2 RNA AMPLIFICATION, QUAL          Imaging Results    None          Medical Decision Making:   Initial Assessment:   Weakness, nausea, vomiting  Differential Diagnosis:   Electrolyte abnormality, COVID, dehydration  Clinical Tests:   Lab Tests: Ordered and Reviewed  The following lab test(s) were unremarkable: CBC, CMP and Lipase  Radiological Study: Ordered and Reviewed  ED Management:  To the ER for evaluation of episode of weakness, fever, N/V and concern for corona virus.  No significant abnormalities found on physical exam.  Fluids and Zofran were administered.  White blood cell [place] at [address].  Blood cultures were drawn out of an abundance of caution.  She has tolerated p.o. and is feeling improvement with these interventions.  She was instructed to return with any new or worsening symptoms.  She verbalized understanding and agreement with plan.                                 Clinical Impression:       ICD-10-CM ICD-9-CM   1. Weakness R53.1 780.79   2. Leukocytosis D72.829 288.60   3. Non-intractable vomiting with nausea, unspecified vomiting type R11.2 787.01                                Haylie Rodriguez PA-C  05/08/20 [id number]

## 2020-05-12 LAB
BACTERIA BLD CULT: NORMAL
BACTERIA BLD CULT: NORMAL

## 2020-10-24 ENCOUNTER — IMMUNIZATION (OUTPATIENT)
Dept: INTERNAL MEDICINE | Facility: CLINIC | Age: 55
End: 2020-10-24
Payer: COMMERCIAL

## 2020-10-24 PROCEDURE — 90686 FLU VACCINE (QUAD) GREATER THAN OR EQUAL TO 3YO PRESERVATIVE FREE IM: ICD-10-PCS | Mod: S$GLB,,, | Performed by: INTERNAL MEDICINE

## 2020-10-24 PROCEDURE — 90686 IIV4 VACC NO PRSV 0.5 ML IM: CPT | Mod: S$GLB,,, | Performed by: INTERNAL MEDICINE

## 2020-10-24 PROCEDURE — 90471 IMMUNIZATION ADMIN: CPT | Mod: S$GLB,,, | Performed by: INTERNAL MEDICINE

## 2020-10-24 PROCEDURE — 90471 FLU VACCINE (QUAD) GREATER THAN OR EQUAL TO 3YO PRESERVATIVE FREE IM: ICD-10-PCS | Mod: S$GLB,,, | Performed by: INTERNAL MEDICINE

## 2020-11-12 ENCOUNTER — OFFICE VISIT (OUTPATIENT)
Dept: URGENT CARE | Facility: CLINIC | Age: 55
End: 2020-11-12
Payer: COMMERCIAL

## 2020-11-12 ENCOUNTER — TELEPHONE (OUTPATIENT)
Dept: INTERNAL MEDICINE | Facility: CLINIC | Age: 55
End: 2020-11-12

## 2020-11-12 VITALS
OXYGEN SATURATION: 98 % | WEIGHT: 170 LBS | BODY MASS INDEX: 30.12 KG/M2 | SYSTOLIC BLOOD PRESSURE: 144 MMHG | RESPIRATION RATE: 16 BRPM | TEMPERATURE: 101 F | HEART RATE: 90 BPM | DIASTOLIC BLOOD PRESSURE: 102 MMHG | HEIGHT: 63 IN

## 2020-11-12 DIAGNOSIS — R03.0 SINGLE EPISODE OF ELEVATED BLOOD PRESSURE: ICD-10-CM

## 2020-11-12 DIAGNOSIS — Z12.31 SCREENING MAMMOGRAM, ENCOUNTER FOR: ICD-10-CM

## 2020-11-12 DIAGNOSIS — Z00.00 ANNUAL PHYSICAL EXAM: Primary | ICD-10-CM

## 2020-11-12 DIAGNOSIS — R50.9 FEVER, UNSPECIFIED FEVER CAUSE: Primary | ICD-10-CM

## 2020-11-12 LAB
CTP QC/QA: YES
CTP QC/QA: YES
FLUAV AG NPH QL: NEGATIVE
FLUBV AG NPH QL: NEGATIVE
SARS-COV-2 RDRP RESP QL NAA+PROBE: NEGATIVE

## 2020-11-12 PROCEDURE — U0002 COVID-19 LAB TEST NON-CDC: HCPCS | Mod: QW,S$GLB,, | Performed by: NURSE PRACTITIONER

## 2020-11-12 PROCEDURE — 87804 POCT INFLUENZA A/B: ICD-10-PCS | Mod: 59,QW,S$GLB, | Performed by: NURSE PRACTITIONER

## 2020-11-12 PROCEDURE — U0002: ICD-10-PCS | Mod: QW,S$GLB,, | Performed by: NURSE PRACTITIONER

## 2020-11-12 PROCEDURE — 3008F BODY MASS INDEX DOCD: CPT | Mod: CPTII,S$GLB,, | Performed by: NURSE PRACTITIONER

## 2020-11-12 PROCEDURE — 87804 INFLUENZA ASSAY W/OPTIC: CPT | Mod: QW,S$GLB,, | Performed by: NURSE PRACTITIONER

## 2020-11-12 PROCEDURE — 99214 PR OFFICE/OUTPT VISIT, EST, LEVL IV, 30-39 MIN: ICD-10-PCS | Mod: 25,S$GLB,, | Performed by: NURSE PRACTITIONER

## 2020-11-12 PROCEDURE — 3008F PR BODY MASS INDEX (BMI) DOCUMENTED: ICD-10-PCS | Mod: CPTII,S$GLB,, | Performed by: NURSE PRACTITIONER

## 2020-11-12 PROCEDURE — 99214 OFFICE O/P EST MOD 30 MIN: CPT | Mod: 25,S$GLB,, | Performed by: NURSE PRACTITIONER

## 2020-11-12 NOTE — TELEPHONE ENCOUNTER
Spoke with pt in detail. She will be going out of town next week until the 1st week of December. She will call then for all appts.    Awilda

## 2020-11-12 NOTE — TELEPHONE ENCOUNTER
Please call    I have not seen her since 2016    Please schedule an EPP, labs prior    She is due for mammogram also    Orders in for all thanks

## 2020-11-12 NOTE — PATIENT INSTRUCTIONS
"Patient Instructions      Please follow up with your Primary care provider within 2-5 days if your signs and symptoms have not resolved or worsen.  The usual course of cold symptoms are 10-14 days.      If your condition worsens or fails to improve we recommend that you receive another evaluation at the emergency room immediately or contact your primary medical clinic to discuss your concerns.      You must understand that you have received an Urgent Care treatment only and that you may be released before all of your medical problems are known or treated.   You, the patient, will arrange for follow up care as instructed.      Tylenol or Ibuprofen can also be used as directed for pain/fever unless you have an allergy to them or medical condition such as stomach ulcers, kidney or liver disease or blood thinners etc for which you should not be taking these type of medications.      Take over the counter cough medication as directed as needed for cough.  You should avoid medications with pseudoephedrine or phenylephrine (any medication with "D") if you have high blood pressure as this can cause an elevation in your blood pressure. Instead consider Corcidin HBP as needed to prevent an elevated blood pressure.      Natural remedies of symptoms (as needed) include humidification, saline nasal sprays, and/or steamy showers.  Increase fluids, warm tea with honey, cough drops as needed.  You may also use salt water gargles for sore throat.     IF you received a steroid shot today - As discussed, this can elevate your blood pressure, elevate your blood sugar, water weight gain, nervous energy, redness to the face and dimpling of the skin at the injection site.      You have tested negative for COVID-19 today.  If you did not have any close exposure as defined below, then effective today, you can return to your normal daily activities including social distancing, wearing masks, and frequent handwashing.     A close exposure is " defined as anyone who had a masked or an unmasked exposure to a known COVID -19 positive person, at less than 6 ft for more than 15 minutes.  If your exposure meets this definition, then you are required to quarantine for 14 days per the CDC.     The 14 day quarantine begins from the day you were exposed, not the day of your test.  For example, if your exposure was on a Monday, and you waited until Friday of the same week to get tested and it was negative, your 14 day quarantine begins from that Monday, not the Friday you tested negative.     If you developed symptoms since the exposure, and your test was negative today, you still have to quarantine for 14 days from the date of the exposure.     So if you meet the definition of a close exposure, A NEGATIVE TEST DOES NOT GET YOU OUT OF 14 DAYS OF QUARANTINE!     OVER THE COUNTER RECOMMENDATIONS/SUGGESTIONS.    Make sure to stay well hydrated.    Use Nasal Saline to mechanically move any post nasal drip from your eustachian tube or from the back of your throat.    Use warm salt water gargles to ease your throat pain. Warm salt water gargles as needed for sore throat-  1/2 tsp salt to 1 cup warm water, gargle as desired.    Use an antihistamine such as Claritin, Zyrtec or Allegra to dry you out.     Use pseudoephedrine (behind the counter) to decongest. Pseudoephedrine  30 mg up to 240 mg /day. It can raise your blood pressure and give you palpitations.    Use mucinex (guaifenisin) to break up mucous up to 2400mg/day to loosen any mucous.   The mucinex DM pill has a cough suppressant that can be sedating. It can be used at night to stop the tickle at the back of your throat.  You can use Mucinex D (it has guaifenesin and a high dose of pseudoephedrine) in the mornings to help decongest.      Use Afrin in each nare for no longer than 3 days, as it is addictive. It can also dry out your mucous membranes and cause elevated blood pressure. This is especially useful if you  are flying.    Use Flonase 1-2 sprays/nostril per day. It is a local acting steroid nasal spray, if you develop a bloody nose, stop using the medication immediately.    Sometimes Nyquil at night is beneficial to help you get some rest, however it is sedating and it does have an antihistamine, and tylenol.    Honey is a natural cough suppressant that can be used.    Tylenol up to 4,000 mg a day is safe for short periods and can be used for body aches, pain, and fever. However in high doses and prolonged use it can cause liver irritation.    Ibuprofen is a non-steroidal anti-inflammatory that can be used for body aches, pain, and fever.However it can also cause stomach irritation if over used.

## 2020-11-12 NOTE — PROGRESS NOTES
"Subjective:       Patient ID: Matt Prado is a 55 y.o. female.    Vitals:  height is 5' 3" (1.6 m) and weight is 77.1 kg (170 lb). Her tympanic temperature is 101.2 °F (38.4 °C) (abnormal). Her blood pressure is 144/102 (abnormal) and her pulse is 90. Her respiration is 16 and oxygen saturation is 98%.     Chief Complaint: Nausea, Fever, and COVID-19 Concerns    Pt c/o fever (101.2), headache, and nausea since yesterday. She does not know of any known contact with covid but works in the [employer]. She denies sinus pressure. She denies cough. She is taking otc medication for her symptoms.     Nausea  This is a new problem. The problem occurs constantly. The problem has been unchanged. Associated symptoms include a fever, headaches and nausea. Pertinent negatives include no arthralgias, chest pain, chills, congestion, coughing, diaphoresis, fatigue, joint swelling, myalgias, rash, sore throat, vertigo, vomiting or weakness. Nothing aggravates the symptoms. She has tried nothing for the symptoms.   Fever   This is a new problem. The current episode started yesterday. The problem occurs constantly. The problem has been unchanged. The temperature was taken using an oral thermometer. Associated symptoms include headaches and nausea. Pertinent negatives include no chest pain, congestion, coughing, diarrhea, ear pain, rash, sore throat, urinary pain, vomiting or wheezing. She has tried nothing for the symptoms.       Constitution: Positive for fever. Negative for chills, sweating and fatigue.   HENT: Negative for ear pain, congestion, sinus pain, sinus pressure, sore throat and voice change.    Neck: Negative for painful lymph nodes.   Cardiovascular: Negative for chest pain and leg swelling.   Eyes: Negative for eye redness, double vision and blurred vision.   Respiratory: Negative for chest tightness, cough, sputum production, bloody sputum, COPD, shortness of breath, stridor, wheezing and asthma.  "   Gastrointestinal: Positive for nausea. Negative for vomiting and diarrhea.   Genitourinary: Negative for dysuria, frequency, urgency and history of kidney stones.   Musculoskeletal: Negative for joint pain, joint swelling, muscle cramps and muscle ache.   Skin: Negative for color change, pale, rash and bruising.   Allergic/Immunologic: Negative for seasonal allergies and asthma.   Neurological: Positive for headaches. Negative for dizziness, history of vertigo, light-headedness and passing out.   Hematologic/Lymphatic: Negative for swollen lymph nodes.   Psychiatric/Behavioral: Negative for nervous/anxious, sleep disturbance and depression. The patient is not nervous/anxious.        Objective:      Physical Exam   Constitutional: She is oriented to person, place, and time. She appears well-developed. She is cooperative.  Non-toxic appearance. She does not appear ill. No distress.   HENT:   Head: Normocephalic and atraumatic.   Ears:   Right Ear: Hearing, tympanic membrane, external ear and ear canal normal.   Left Ear: Hearing, tympanic membrane, external ear and ear canal normal.   Nose: Nose normal. No mucosal edema, rhinorrhea or nasal deformity. No epistaxis. Right sinus exhibits no maxillary sinus tenderness and no frontal sinus tenderness. Left sinus exhibits no maxillary sinus tenderness and no frontal sinus tenderness.   Mouth/Throat: Uvula is midline, oropharynx is clear and moist and mucous membranes are normal. No trismus in the jaw. Normal dentition. No uvula swelling. No oropharyngeal exudate, posterior oropharyngeal edema or posterior oropharyngeal erythema.   Eyes: Conjunctivae and lids are normal. No scleral icterus.   Neck: Trachea normal, full passive range of motion without pain and phonation normal. Neck supple. No neck rigidity. No edema and no erythema present.   Cardiovascular: Normal rate, regular rhythm, normal heart sounds and normal pulses.   Pulmonary/Chest: Effort normal and breath  sounds normal. No respiratory distress. She has no decreased breath sounds. She has no rhonchi.   Abdominal: Normal appearance.   Musculoskeletal: Normal range of motion.         General: No deformity.   Neurological: She is alert and oriented to person, place, and time. She exhibits normal muscle tone. Coordination normal.   Skin: Skin is warm, dry, intact, not diaphoretic and not pale. Psychiatric: Her speech is normal and behavior is normal. Judgment and thought content normal.   Nursing note and vitals reviewed.    Results for orders placed or performed in visit on 11/12/20   POCT COVID-19 Rapid Screening   Result Value Ref Range    POC Rapid COVID Negative Negative     Acceptable Yes    POCT Influenza A/B   Result Value Ref Range    Rapid Influenza A Ag Negative Negative    Rapid Influenza B Ag Negative Negative     Acceptable Yes            Assessment:       1. Fever, unspecified fever cause    2. Single episode of elevated blood pressure        Plan:         Fever, unspecified fever cause  -     POCT COVID-19 Rapid Screening  -     POCT Influenza A/B    Single episode of elevated blood pressure

## 2021-04-05 ENCOUNTER — PATIENT MESSAGE (OUTPATIENT)
Dept: RESEARCH | Facility: HOSPITAL | Age: 56
End: 2021-04-05

## 2021-08-27 ENCOUNTER — PATIENT MESSAGE (OUTPATIENT)
Dept: INTERNAL MEDICINE | Facility: CLINIC | Age: 56
End: 2021-08-27

## 2021-08-27 ENCOUNTER — OFFICE VISIT (OUTPATIENT)
Dept: URGENT CARE | Facility: CLINIC | Age: 56
End: 2021-08-27
Payer: COMMERCIAL

## 2021-08-27 VITALS
BODY MASS INDEX: 30.12 KG/M2 | HEIGHT: 63 IN | RESPIRATION RATE: 18 BRPM | TEMPERATURE: 99 F | WEIGHT: 170 LBS | HEART RATE: 88 BPM | OXYGEN SATURATION: 98 % | SYSTOLIC BLOOD PRESSURE: 154 MMHG | DIASTOLIC BLOOD PRESSURE: 106 MMHG

## 2021-08-27 DIAGNOSIS — R03.0 ELEVATED BLOOD PRESSURE READING: ICD-10-CM

## 2021-08-27 DIAGNOSIS — S91.332A PUNCTURE WOUND OF LEFT FOOT EXCLUDING TOES WITHOUT COMPLICATION, INITIAL ENCOUNTER: Primary | ICD-10-CM

## 2021-08-27 PROCEDURE — 1159F MED LIST DOCD IN RCRD: CPT | Mod: CPTII,S$GLB,, | Performed by: STUDENT IN AN ORGANIZED HEALTH CARE EDUCATION/TRAINING PROGRAM

## 2021-08-27 PROCEDURE — 3008F BODY MASS INDEX DOCD: CPT | Mod: CPTII,S$GLB,, | Performed by: STUDENT IN AN ORGANIZED HEALTH CARE EDUCATION/TRAINING PROGRAM

## 2021-08-27 PROCEDURE — 1160F PR REVIEW ALL MEDS BY PRESCRIBER/CLIN PHARMACIST DOCUMENTED: ICD-10-PCS | Mod: CPTII,S$GLB,, | Performed by: STUDENT IN AN ORGANIZED HEALTH CARE EDUCATION/TRAINING PROGRAM

## 2021-08-27 PROCEDURE — 99213 OFFICE O/P EST LOW 20 MIN: CPT | Mod: 25,S$GLB,, | Performed by: STUDENT IN AN ORGANIZED HEALTH CARE EDUCATION/TRAINING PROGRAM

## 2021-08-27 PROCEDURE — 3080F PR MOST RECENT DIASTOLIC BLOOD PRESSURE >= 90 MM HG: ICD-10-PCS | Mod: CPTII,S$GLB,, | Performed by: STUDENT IN AN ORGANIZED HEALTH CARE EDUCATION/TRAINING PROGRAM

## 2021-08-27 PROCEDURE — 1160F RVW MEDS BY RX/DR IN RCRD: CPT | Mod: CPTII,S$GLB,, | Performed by: STUDENT IN AN ORGANIZED HEALTH CARE EDUCATION/TRAINING PROGRAM

## 2021-08-27 PROCEDURE — 3080F DIAST BP >= 90 MM HG: CPT | Mod: CPTII,S$GLB,, | Performed by: STUDENT IN AN ORGANIZED HEALTH CARE EDUCATION/TRAINING PROGRAM

## 2021-08-27 PROCEDURE — 90715 TDAP VACCINE GREATER THAN OR EQUAL TO 7YO IM: ICD-10-PCS | Mod: S$GLB,,, | Performed by: STUDENT IN AN ORGANIZED HEALTH CARE EDUCATION/TRAINING PROGRAM

## 2021-08-27 PROCEDURE — 90471 IMMUNIZATION ADMIN: CPT | Mod: S$GLB,,, | Performed by: STUDENT IN AN ORGANIZED HEALTH CARE EDUCATION/TRAINING PROGRAM

## 2021-08-27 PROCEDURE — 99213 PR OFFICE/OUTPT VISIT, EST, LEVL III, 20-29 MIN: ICD-10-PCS | Mod: 25,S$GLB,, | Performed by: STUDENT IN AN ORGANIZED HEALTH CARE EDUCATION/TRAINING PROGRAM

## 2021-08-27 PROCEDURE — 3077F SYST BP >= 140 MM HG: CPT | Mod: CPTII,S$GLB,, | Performed by: STUDENT IN AN ORGANIZED HEALTH CARE EDUCATION/TRAINING PROGRAM

## 2021-08-27 PROCEDURE — 90715 TDAP VACCINE 7 YRS/> IM: CPT | Mod: S$GLB,,, | Performed by: STUDENT IN AN ORGANIZED HEALTH CARE EDUCATION/TRAINING PROGRAM

## 2021-08-27 PROCEDURE — 1159F PR MEDICATION LIST DOCUMENTED IN MEDICAL RECORD: ICD-10-PCS | Mod: CPTII,S$GLB,, | Performed by: STUDENT IN AN ORGANIZED HEALTH CARE EDUCATION/TRAINING PROGRAM

## 2021-08-27 PROCEDURE — 90471 TDAP VACCINE GREATER THAN OR EQUAL TO 7YO IM: ICD-10-PCS | Mod: S$GLB,,, | Performed by: STUDENT IN AN ORGANIZED HEALTH CARE EDUCATION/TRAINING PROGRAM

## 2021-08-27 PROCEDURE — 3077F PR MOST RECENT SYSTOLIC BLOOD PRESSURE >= 140 MM HG: ICD-10-PCS | Mod: CPTII,S$GLB,, | Performed by: STUDENT IN AN ORGANIZED HEALTH CARE EDUCATION/TRAINING PROGRAM

## 2021-08-27 PROCEDURE — 3008F PR BODY MASS INDEX (BMI) DOCUMENTED: ICD-10-PCS | Mod: CPTII,S$GLB,, | Performed by: STUDENT IN AN ORGANIZED HEALTH CARE EDUCATION/TRAINING PROGRAM

## 2021-08-27 RX ORDER — LEVOFLOXACIN 500 MG/1
500 TABLET, FILM COATED ORAL DAILY
Qty: 5 TABLET | Refills: 0 | Status: SHIPPED | OUTPATIENT
Start: 2021-08-27 | End: 2021-09-01

## 2021-08-27 RX ORDER — CEPHALEXIN 500 MG/1
500 CAPSULE ORAL EVERY 8 HOURS
Qty: 15 CAPSULE | Refills: 0 | Status: SHIPPED | OUTPATIENT
Start: 2021-08-27 | End: 2021-09-01

## 2021-11-30 ENCOUNTER — HOSPITAL ENCOUNTER (OUTPATIENT)
Dept: RADIOLOGY | Facility: HOSPITAL | Age: 56
Discharge: HOME | End: 2021-11-30
Attending: ORTHOPAEDIC SURGERY
Payer: COMMERCIAL

## 2021-11-30 ENCOUNTER — OFFICE VISIT (OUTPATIENT)
Dept: ORTHOPEDICS | Facility: CLINIC | Age: 56
End: 2021-11-30
Payer: COMMERCIAL

## 2021-11-30 VITALS — BODY MASS INDEX: 31.89 KG/M2 | WEIGHT: 180 LBS | HEIGHT: 63 IN

## 2021-11-30 DIAGNOSIS — M25.572 LEFT ANKLE PAIN, UNSPECIFIED CHRONICITY: ICD-10-CM

## 2021-11-30 DIAGNOSIS — M76.822 POSTERIOR TIBIAL TENDONITIS, LEFT: Primary | ICD-10-CM

## 2021-11-30 PROCEDURE — 99204 PR OFFICE/OUTPT VISIT, NEW, LEVL IV, 45-59 MIN: ICD-10-PCS | Mod: S$GLB,,, | Performed by: ORTHOPAEDIC SURGERY

## 2021-11-30 PROCEDURE — 99999 PR PBB SHADOW E&M-EST. PATIENT-LVL III: CPT | Mod: PBBFAC,,, | Performed by: ORTHOPAEDIC SURGERY

## 2021-11-30 PROCEDURE — 73610 XR ANKLE COMPLETE 3 VIEW LEFT: ICD-10-PCS | Mod: 26,LT,, | Performed by: RADIOLOGY

## 2021-11-30 PROCEDURE — 73610 X-RAY EXAM OF ANKLE: CPT | Mod: 26,LT,, | Performed by: RADIOLOGY

## 2021-11-30 PROCEDURE — 73610 X-RAY EXAM OF ANKLE: CPT | Mod: TC,LT

## 2021-11-30 PROCEDURE — 99999 PR PBB SHADOW E&M-EST. PATIENT-LVL III: ICD-10-PCS | Mod: PBBFAC,,, | Performed by: ORTHOPAEDIC SURGERY

## 2021-11-30 PROCEDURE — 99204 OFFICE O/P NEW MOD 45 MIN: CPT | Mod: S$GLB,,, | Performed by: ORTHOPAEDIC SURGERY

## 2022-02-23 ENCOUNTER — PATIENT MESSAGE (OUTPATIENT)
Dept: ORTHOPEDICS | Facility: CLINIC | Age: 57
End: 2022-02-23
Payer: COMMERCIAL

## 2022-03-07 ENCOUNTER — OFFICE VISIT (OUTPATIENT)
Dept: ORTHOPEDICS | Facility: CLINIC | Age: 57
End: 2022-03-07
Payer: COMMERCIAL

## 2022-03-07 VITALS — HEIGHT: 63 IN | WEIGHT: 179.88 LBS | BODY MASS INDEX: 31.87 KG/M2

## 2022-03-07 DIAGNOSIS — M76.822 POSTERIOR TIBIAL TENDONITIS, LEFT: Primary | ICD-10-CM

## 2022-03-07 PROCEDURE — 1159F MED LIST DOCD IN RCRD: CPT | Mod: CPTII,S$GLB,, | Performed by: ORTHOPAEDIC SURGERY

## 2022-03-07 PROCEDURE — 99214 PR OFFICE/OUTPT VISIT, EST, LEVL IV, 30-39 MIN: ICD-10-PCS | Mod: S$GLB,,, | Performed by: ORTHOPAEDIC SURGERY

## 2022-03-07 PROCEDURE — 1159F PR MEDICATION LIST DOCUMENTED IN MEDICAL RECORD: ICD-10-PCS | Mod: CPTII,S$GLB,, | Performed by: ORTHOPAEDIC SURGERY

## 2022-03-07 PROCEDURE — 3008F BODY MASS INDEX DOCD: CPT | Mod: CPTII,S$GLB,, | Performed by: ORTHOPAEDIC SURGERY

## 2022-03-07 PROCEDURE — 3008F PR BODY MASS INDEX (BMI) DOCUMENTED: ICD-10-PCS | Mod: CPTII,S$GLB,, | Performed by: ORTHOPAEDIC SURGERY

## 2022-03-07 PROCEDURE — 99999 PR PBB SHADOW E&M-EST. PATIENT-LVL III: CPT | Mod: PBBFAC,,, | Performed by: ORTHOPAEDIC SURGERY

## 2022-03-07 PROCEDURE — 99214 OFFICE O/P EST MOD 30 MIN: CPT | Mod: S$GLB,,, | Performed by: ORTHOPAEDIC SURGERY

## 2022-03-07 PROCEDURE — 99999 PR PBB SHADOW E&M-EST. PATIENT-LVL III: ICD-10-PCS | Mod: PBBFAC,,, | Performed by: ORTHOPAEDIC SURGERY

## 2022-03-07 RX ORDER — METHYLPREDNISOLONE 4 MG/1
TABLET ORAL
Qty: 21 EACH | Refills: 0 | Status: SHIPPED | OUTPATIENT
Start: 2022-03-07 | End: 2022-03-28

## 2022-03-07 NOTE — PROGRESS NOTES
"Subjective:      Patient ID: Matt Prado is a 56 y.o. female.    Chief Complaint: Pain of the Left Ankle      HPI:  This is a 56-year-old female who is established in the clinic but is new to me.  She was seen in November by Dr. Wooten for left posterior tibial tendinitis and was treated with conservative measures including a ankle brace and activity modification.  She comes in for evaluation reports that she is actually doing much better but is going out of the country in April and wanted to be re-evaluated again.  She reports some continued mild medial-sided hindfoot pain with prolonged activities.  She does not report any significant swelling.      Current Outpatient Medications:     biotin 10 mg Tab, Take by mouth., Disp: , Rfl:     cetirizine (ZYRTEC) 10 MG tablet, Take 10 mg by mouth daily as needed., Disp: , Rfl:     JUBLIA 10 % Macario, APPLY TO NAILS DAILY, Disp: , Rfl: 10    L.acidoph-L.rhamn-B.bif-B.long 12.9 mg (2 billion cell) TbEC, Take by mouth., Disp: , Rfl:     omeprazole (PRILOSEC) 40 MG capsule, TAKE 1 CAPSULE(40 MG) BY MOUTH EVERY DAY, Disp: 30 capsule, Rfl: 0    ondansetron (ZOFRAN-ODT) 4 MG TbDL, Take 1 tablet (4 mg total) by mouth every 6 (six) hours as needed., Disp: 30 tablet, Rfl: 0    vitamin D 1000 units Tab, Take 1,000 mg by mouth once daily., Disp: , Rfl:   Review of patient's allergies indicates:   Allergen Reactions    Percocet [oxycodone-acetaminophen] Itching     C/o of vomiting and itching    Erythromycin Other (See Comments)     Stomach pain       Ht 5' 3" (1.6 m)   Wt 81.6 kg (179 lb 14.3 oz)   BMI 31.87 kg/m²     ROS:  Negative for chest pain, shortness of breath, fevers, or unexplained weight loss.      Objective:    Ortho Exam       This is a well-developed well-nourished female who walks in with a normal gait.  On standing inspection she has bilateral pes planus alignment of her feet.  She is able to do a double limb heel rise with minimal discomfort medially on " the left side.  On sitting exam she has mild tenderness along the course of the posterior tibial tendon but has full motion of her ankle and subtalar joint and good functional strength of the posterior tibial tendon.  She is neurovascularly intact.      Assessment:     Imaging:  No new images were obtained        1. Posterior tibial tendonitis, left               Plan:          Recommendation:  It is encouraging that her symptoms have improved with conservative measures.  I suggested a course of physical therapy prior to her trip and I also suggested she obtain some better over-the-counter orthotics for her trip.  I also prescribed a Medrol Dosepak that she can use if necessary on her trip.    Follow-up in 3 months if necessary

## 2022-12-06 ENCOUNTER — OFFICE VISIT (OUTPATIENT)
Dept: SPORTS MEDICINE | Facility: CLINIC | Age: 57
End: 2022-12-06
Payer: COMMERCIAL

## 2022-12-06 ENCOUNTER — HOSPITAL ENCOUNTER (OUTPATIENT)
Dept: RADIOLOGY | Facility: HOSPITAL | Age: 57
Discharge: HOME | End: 2022-12-06
Attending: ORTHOPAEDIC SURGERY
Payer: COMMERCIAL

## 2022-12-06 VITALS
SYSTOLIC BLOOD PRESSURE: 144 MMHG | HEART RATE: 96 BPM | BODY MASS INDEX: 31.71 KG/M2 | WEIGHT: 179 LBS | HEIGHT: 63 IN | DIASTOLIC BLOOD PRESSURE: 94 MMHG

## 2022-12-06 DIAGNOSIS — M94.261 CHONDROMALACIA OF RIGHT KNEE: Primary | ICD-10-CM

## 2022-12-06 DIAGNOSIS — M25.569 KNEE PAIN, UNSPECIFIED CHRONICITY, UNSPECIFIED LATERALITY: ICD-10-CM

## 2022-12-06 DIAGNOSIS — M25.561 ACUTE PAIN OF RIGHT KNEE: ICD-10-CM

## 2022-12-06 PROCEDURE — 99999 PR PBB SHADOW E&M-EST. PATIENT-LVL III: CPT | Mod: PBBFAC,,, | Performed by: ORTHOPAEDIC SURGERY

## 2022-12-06 PROCEDURE — 3080F DIAST BP >= 90 MM HG: CPT | Mod: CPTII,S$GLB,, | Performed by: ORTHOPAEDIC SURGERY

## 2022-12-06 PROCEDURE — 73564 X-RAY EXAM KNEE 4 OR MORE: CPT | Mod: 26,,, | Performed by: RADIOLOGY

## 2022-12-06 PROCEDURE — 99999 PR PBB SHADOW E&M-EST. PATIENT-LVL III: ICD-10-PCS | Mod: PBBFAC,,, | Performed by: ORTHOPAEDIC SURGERY

## 2022-12-06 PROCEDURE — 3077F SYST BP >= 140 MM HG: CPT | Mod: CPTII,S$GLB,, | Performed by: ORTHOPAEDIC SURGERY

## 2022-12-06 PROCEDURE — 73564 XR KNEE ORTHO BILAT WITH FLEXION: ICD-10-PCS | Mod: 26,,, | Performed by: RADIOLOGY

## 2022-12-06 PROCEDURE — 99204 OFFICE O/P NEW MOD 45 MIN: CPT | Mod: 25,S$GLB,, | Performed by: ORTHOPAEDIC SURGERY

## 2022-12-06 PROCEDURE — 3077F PR MOST RECENT SYSTOLIC BLOOD PRESSURE >= 140 MM HG: ICD-10-PCS | Mod: CPTII,S$GLB,, | Performed by: ORTHOPAEDIC SURGERY

## 2022-12-06 PROCEDURE — 3080F PR MOST RECENT DIASTOLIC BLOOD PRESSURE >= 90 MM HG: ICD-10-PCS | Mod: CPTII,S$GLB,, | Performed by: ORTHOPAEDIC SURGERY

## 2022-12-06 PROCEDURE — 73564 X-RAY EXAM KNEE 4 OR MORE: CPT | Mod: TC,50

## 2022-12-06 PROCEDURE — 1159F MED LIST DOCD IN RCRD: CPT | Mod: CPTII,S$GLB,, | Performed by: ORTHOPAEDIC SURGERY

## 2022-12-06 PROCEDURE — 1159F PR MEDICATION LIST DOCUMENTED IN MEDICAL RECORD: ICD-10-PCS | Mod: CPTII,S$GLB,, | Performed by: ORTHOPAEDIC SURGERY

## 2022-12-06 PROCEDURE — 20610 LARGE JOINT ASPIRATION/INJECTION: R KNEE: ICD-10-PCS | Mod: RT,S$GLB,, | Performed by: ORTHOPAEDIC SURGERY

## 2022-12-06 PROCEDURE — 3008F BODY MASS INDEX DOCD: CPT | Mod: CPTII,S$GLB,, | Performed by: ORTHOPAEDIC SURGERY

## 2022-12-06 PROCEDURE — 99204 PR OFFICE/OUTPT VISIT, NEW, LEVL IV, 45-59 MIN: ICD-10-PCS | Mod: 25,S$GLB,, | Performed by: ORTHOPAEDIC SURGERY

## 2022-12-06 PROCEDURE — 20610 DRAIN/INJ JOINT/BURSA W/O US: CPT | Mod: RT,S$GLB,, | Performed by: ORTHOPAEDIC SURGERY

## 2022-12-06 PROCEDURE — 3008F PR BODY MASS INDEX (BMI) DOCUMENTED: ICD-10-PCS | Mod: CPTII,S$GLB,, | Performed by: ORTHOPAEDIC SURGERY

## 2022-12-06 RX ORDER — CELECOXIB 200 MG/1
200 CAPSULE ORAL DAILY
Qty: 30 CAPSULE | Refills: 0 | Status: SHIPPED | OUTPATIENT
Start: 2022-12-06 | End: 2023-01-05

## 2022-12-06 RX ORDER — TRIAMCINOLONE ACETONIDE 40 MG/ML
40 INJECTION, SUSPENSION INTRA-ARTICULAR; INTRAMUSCULAR
Status: DISCONTINUED | OUTPATIENT
Start: 2022-12-06 | End: 2022-12-06 | Stop reason: HOSPADM

## 2022-12-06 RX ADMIN — TRIAMCINOLONE ACETONIDE 40 MG: 40 INJECTION, SUSPENSION INTRA-ARTICULAR; INTRAMUSCULAR at 02:12

## 2022-12-06 NOTE — PROGRESS NOTES
CC: Right knee pain    57 y.o. Female who presents as a new patient to me. She works as a .  She reports an approximate 2 week history of insidious onset knee pain.  Localized anterolaterally.  Denies any specific antecedent injury event although she does associated symptom onset to a [place]..  She does states that yesterday when she was getting up from her desk and she felt a sharp pain.  She describes associated mechanical symptoms.  Symptoms worse with getting up out of a chair, going up and down stairs. 7-8/10 in severity. She states she started to walk with a limp and she presents to clinic today with a knee sleeve.  Denies swelling or effusions. Better with rest. Treatment thus far has included rest, activity modifications, oral medications and a knee sleeve. Here today to discuss diagnosis and treatment options.      Pain Score:   5    REVIEW OF SYSTEMS:   Constitution: Negative. Negative for chills, fever and night sweats.    Hematologic/Lymphatic: Negative for bleeding problem. Does not bruise/bleed easily.   Skin: Negative for dry skin, itching and rash.   Musculoskeletal: Negative for falls. Positive for right knee pain and muscle weakness.     All other review of symptoms were reviewed and found to be noncontributory.     PAST MEDICAL HISTORY:   Past Medical History:   Diagnosis Date    Allergy     Family history of melanoma 1/23/2014    Former light tobacco smoker: < 5 per day x 10 years 5/24/2016    GERD (gastroesophageal reflux disease)     Helicobacter pylori ab+ treated 2007 1/24/2014    Situational stress 1/24/2014    Smoker: trivial 2/27/2015     PAST SURGICAL HISTORY:   Past Surgical History:   Procedure Laterality Date    CHOLECYSTECTOMY      COLONOSCOPY N/A 9/13/2016    Procedure: COLONOSCOPY;  Surgeon: Dionisio Meier MD;  Location: [facility] ([address] [place] [address]);  Service: Endoscopy;  Laterality: N/A;  combined order to be done with EGD       FAMILY HISTORY:   Family History   Problem Relation Age of Onset    Breast cancer Maternal Grandmother     Cancer Maternal Grandmother     Cancer Father         melanoma    Multiple sclerosis Father     Cancer Paternal Grandmother     Cancer Mother         Skin cancer    Arthritis Mother     Skin cancer Mother     Heart disease Neg Hx     Colon cancer Neg Hx     Ovarian cancer Neg Hx     Diabetes Neg Hx     Hypertension Neg Hx      SOCIAL HISTORY:   Social History     Socioeconomic History    Marital status:     Number of children: 3   Tobacco Use    Smoking status: Former     Packs/day: 0.25     Years: 10.00     Pack years: 2.50     Types: Cigarettes     Quit date: 2016     Years since quittin.9    Smokeless tobacco: Never   Substance and Sexual Activity    Alcohol use: Yes     Alcohol/week: 2.0 standard drinks     Types: 2 Glasses of wine per week     Comment: 2-4  glasses every night    Drug use: No    Sexual activity: Yes     Partners: Male     Birth control/protection: None     MEDICATIONS:     Current Outpatient Medications:     biotin 10 mg Tab, Take by mouth., Disp: , Rfl:     cetirizine (ZYRTEC) 10 MG tablet, Take 10 mg by mouth daily as needed., Disp: , Rfl:     JUBLIA 10 % Macario, APPLY TO NAILS DAILY, Disp: , Rfl: 10    L.acidoph-L.rhamn-B.bif-B.long 12.9 mg (2 billion cell) TbEC, Take by mouth., Disp: , Rfl:     omeprazole (PRILOSEC) 40 MG capsule, TAKE 1 CAPSULE(40 MG) BY MOUTH EVERY DAY, Disp: 30 capsule, Rfl: 0    ondansetron (ZOFRAN-ODT) 4 MG TbDL, Take 1 tablet (4 mg total) by mouth every 6 (six) hours as needed., Disp: 30 tablet, Rfl: 0    vitamin D 1000 units Tab, Take 1,000 mg by mouth once daily., Disp: , Rfl:     ALLERGIES:   Review of patient's allergies indicates:   Allergen Reactions    Percocet [oxycodone-acetaminophen] Itching     C/o of vomiting and itching    Erythromycin Other (See Comments)     Stomach pain      PHYSICAL EXAMINATION:  BP  "(!) 144/94   Pulse 96   Ht 5' 3" (1.6 m)   Wt 81.2 kg (179 lb)   BMI 31.71 kg/m²   General: Well-developed well-nourished 57 y.o. femalein no acute distress   Cardiovascular: Regular rhythm by palpation of distal pulse, normal color and temperature, no concerning varicosities on symptomatic side   Lungs: No labored breathing or wheezing appreciated   Neuro: Alert and oriented ×3   Psychiatric: well oriented to person, place and time, demonstrates normal mood and affect   Skin: No rashes, lesions or ulcers, normal temperature, turgor, and texture on involved extremity    Ortho/SPM Exam  Examination of the right knee demonstrates intact extensor mechanism. No effusion or prepatellar swelling. Central patellar tracking.  Positive patellar crepitus and grind. Full extension. Flexion to 130 with pain anteriorly on forced flexion. Mild tenderness along the lateral joint line.  Pain to palpation over the patellar facets.  Negative Maida's for medial or lateral joint specific pain. Negative Lachman. Stable to varus/valgus stress testing at 0 and 30 deg. Negative posterior drawer. Ligamentously stable.      IMAGING:  X-rays including standing, weight bearing AP and flexion bilateral knees, RIGHT knee lateral and sunrise views ordered and images reviewed by me show:    Mild marginal spur formation indicative of a degenerative process.  Well-maintained joint spaces otherwise.    ASSESSMENT:      ICD-10-CM ICD-9-CM   1. Chondromalacia of right knee  M94.261 717.7   2. Acute pain of right knee  M25.561 719.46       PLAN:     Findings discussed with the patient.  Mostly anterior/retropatellar knee pain associated with bent knee activities.  Provoked with a day of mound hiking.  On exam she has patellofemoral crepitus with pain over the anterior knee with provocative maneuvers.  Most consistent with symptomatic chondromalacia.  Initial conservative treatment recommended.  Corticosteroid injection given.  Physical therapy " referral to [facility].  Celebrex prescription given.  We will see how she does with that.  If pain is persistent or recurrent despite these measures, next step would be MRI.      Large Joint Aspiration/Injection: R knee    Date/Time: 12/6/2022 2:00 PM  Performed by: YOANNA Roberts MD  Authorized by: YOANNA Roberts MD     Consent Done?:  Yes (Verbal)  Indications:  Pain  Site marked: the procedure site was marked    Timeout: prior to procedure the correct patient, procedure, and site was verified    Prep: patient was prepped and draped in usual sterile fashion      Local anesthesia used?: Yes    Local anesthetic:  Co-phenylcaine spray (0.2% Naropin)  Anesthetic total (ml):  4      Details:  Needle Size:  22 G  Ultrasonic Guidance for needle placement?: No    Approach:  Lateral  Location:  Knee  Site:  R knee  Medications:  40 mg triamcinolone acetonide 40 mg/mL  Patient tolerance:  Patient tolerated the procedure well with no immediate complications

## 2023-04-25 ENCOUNTER — TELEPHONE (OUTPATIENT)
Dept: INTERNAL MEDICINE | Facility: CLINIC | Age: 58
End: 2023-04-25
Payer: COMMERCIAL

## 2023-04-25 DIAGNOSIS — Z12.31 VISIT FOR SCREENING MAMMOGRAM: Primary | ICD-10-CM

## 2023-04-25 NOTE — TELEPHONE ENCOUNTER
Please call her, I have not seen her since 2016.  Does she still consider me her primary physician?  Please schedule if so    If not, please remove her from the PCP field    She is due for labs, mammogram, Pap smear

## 2023-04-25 NOTE — TELEPHONE ENCOUNTER
----- Message from Herlinda Wayne sent at 4/25/2023  1:52 PM CDT -----  Type: Patient Call Back         Who called: Pt          What is the request in detail: Pt called in regarding requesting orders for a Mammo gram          Can the clinic reply by [facility]?no          Would the patient rather a call back or a response via [facility]? Call back          Best call back number: [phone] (mobile)          Additional Information:           Thank You

## 2023-05-10 ENCOUNTER — APPOINTMENT (OUTPATIENT)
Dept: RADIOLOGY | Facility: OTHER | Age: 58
End: 2023-05-10
Attending: INTERNAL MEDICINE
Payer: COMMERCIAL

## 2023-05-10 VITALS — BODY MASS INDEX: 31.71 KG/M2 | WEIGHT: 179 LBS | HEIGHT: 63 IN

## 2023-05-10 DIAGNOSIS — Z12.31 VISIT FOR SCREENING MAMMOGRAM: ICD-10-CM

## 2023-05-10 PROCEDURE — 77067 MAMMO DIGITAL SCREENING BILAT WITH TOMO: ICD-10-PCS | Mod: 26,,, | Performed by: RADIOLOGY

## 2023-05-10 PROCEDURE — 77067 SCR MAMMO BI INCL CAD: CPT | Mod: TC,PN

## 2023-05-10 PROCEDURE — 77063 BREAST TOMOSYNTHESIS BI: CPT | Mod: 26,,, | Performed by: RADIOLOGY

## 2023-05-10 PROCEDURE — 77067 SCR MAMMO BI INCL CAD: CPT | Mod: 26,,, | Performed by: RADIOLOGY

## 2023-05-10 PROCEDURE — 77063 MAMMO DIGITAL SCREENING BILAT WITH TOMO: ICD-10-PCS | Mod: 26,,, | Performed by: RADIOLOGY

## 2023-07-03 ENCOUNTER — PATIENT MESSAGE (OUTPATIENT)
Dept: INTERNAL MEDICINE | Facility: CLINIC | Age: 58
End: 2023-07-03
Payer: COMMERCIAL

## 2023-07-03 ENCOUNTER — TELEPHONE (OUTPATIENT)
Dept: INTERNAL MEDICINE | Facility: CLINIC | Age: 58
End: 2023-07-03
Payer: COMMERCIAL

## 2023-07-03 NOTE — TELEPHONE ENCOUNTER
She has not answered our phone calls.    Letter sent today as well as Willie message    I have not seen her for years.  I am no longer PCP    I signed the mammogram order    PLEASE REMOVE HER FROM ANY WOG LISTS AND ANY AUTOMATIC ORDERS THANKS    LB

## 2023-07-03 NOTE — LETTER
July 3, 2023    Matt VALVERDE Eve  [address] [place] [address]             [facility]ikhericka [facility]  [address]  [place] [address]  Phone: [phone]  Fax: [phone] Dear Mrs. Prado:    Your health and well-being are very important to us.    We have tried to reach you on several occasions to schedule your routine annual exam along with labs and health maintenance but have not been able to speak to you.    I did order your mammogram this year, and it was acceptable.    However, since I have not seen you since 2016 I will not be able to continue to order any testing or medication without you coming in for an office visit.        Sincerely,        Helga Duvall MD, FACP

## 2023-09-08 ENCOUNTER — TELEPHONE (OUTPATIENT)
Dept: ORTHOPEDICS | Facility: CLINIC | Age: 58
End: 2023-09-08
Payer: COMMERCIAL

## 2023-09-08 DIAGNOSIS — M79.642 LEFT HAND PAIN: Primary | ICD-10-CM

## 2023-09-11 ENCOUNTER — HOSPITAL ENCOUNTER (OUTPATIENT)
Dept: RADIOLOGY | Facility: OTHER | Age: 58
Discharge: HOME | End: 2023-09-11
Attending: PHYSICIAN ASSISTANT
Payer: COMMERCIAL

## 2023-09-11 ENCOUNTER — OFFICE VISIT (OUTPATIENT)
Dept: ORTHOPEDICS | Facility: CLINIC | Age: 58
End: 2023-09-11
Payer: COMMERCIAL

## 2023-09-11 DIAGNOSIS — M18.12 ARTHRITIS OF CARPOMETACARPAL (CMC) JOINT OF LEFT THUMB: Primary | ICD-10-CM

## 2023-09-11 DIAGNOSIS — M79.642 LEFT HAND PAIN: ICD-10-CM

## 2023-09-11 PROCEDURE — 1159F MED LIST DOCD IN RCRD: CPT | Mod: CPTII,S$GLB,, | Performed by: PHYSICIAN ASSISTANT

## 2023-09-11 PROCEDURE — 1159F PR MEDICATION LIST DOCUMENTED IN MEDICAL RECORD: ICD-10-PCS | Mod: CPTII,S$GLB,, | Performed by: PHYSICIAN ASSISTANT

## 2023-09-11 PROCEDURE — 99999 PR PBB SHADOW E&M-EST. PATIENT-LVL II: ICD-10-PCS | Mod: PBBFAC,,, | Performed by: PHYSICIAN ASSISTANT

## 2023-09-11 PROCEDURE — 99999 PR PBB SHADOW E&M-EST. PATIENT-LVL II: CPT | Mod: PBBFAC,,, | Performed by: PHYSICIAN ASSISTANT

## 2023-09-11 PROCEDURE — 73130 X-RAY EXAM OF HAND: CPT | Mod: TC,FY,LT

## 2023-09-11 PROCEDURE — 73130 X-RAY EXAM OF HAND: CPT | Mod: 26,LT,, | Performed by: RADIOLOGY

## 2023-09-11 PROCEDURE — 99203 OFFICE O/P NEW LOW 30 MIN: CPT | Mod: S$GLB,,, | Performed by: PHYSICIAN ASSISTANT

## 2023-09-11 PROCEDURE — 99203 PR OFFICE/OUTPT VISIT, NEW, LEVL III, 30-44 MIN: ICD-10-PCS | Mod: S$GLB,,, | Performed by: PHYSICIAN ASSISTANT

## 2023-09-11 PROCEDURE — 73130 XR HAND COMPLETE 3 VIEW LEFT: ICD-10-PCS | Mod: 26,LT,, | Performed by: RADIOLOGY

## 2023-09-11 NOTE — PROGRESS NOTES
[facility]  History & Physical  Orthopedics    SUBJECTIVE:      Chief Complaint: Left thumb pain    Referring Provider: Maynor Nur Dr. is the supervising physician for this encounter/patient    History of Present Illness:  Patient is a 58 y.o. right hand dominant female who presents today with complaints of left thumb pain, off/on for several years, but has been more progressive for about 6 months. History of thumb injury several years ago, no fractures at that time. Pain located to the base/pad of the thumb, worse with gripping/twisting. 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 No surgical history on the hands.     Onset of symptoms/DOI was progressive for 6 months.    Symptoms are aggravated by activity and movement.    Symptoms are alleviated by rest.    Symptoms consist of pain.    The patient rates their pain as a 4/10.    Attempted treatment(s) and/or interventions include activity modifications, rest.     The patient denies any fevers, chills, N/V, D/C and presents for evaluation.       Past Medical History:   Diagnosis Date    Allergy     Family history of melanoma 1/23/2014    Former light tobacco smoker: < 5 per day x 10 years 5/24/2016    GERD (gastroesophageal reflux disease)     Helicobacter pylori ab+ treated 2007 1/24/2014    Situational stress 1/24/2014    Smoker: trivial 2/27/2015     Past Surgical History:   Procedure Laterality Date    CHOLECYSTECTOMY      COLONOSCOPY N/A 9/13/2016    Procedure: COLONOSCOPY;  Surgeon: Dionisio Meier MD;  Location: [address]);  Service: Endoscopy;  Laterality: N/A;  combined order to be done with EGD      Review of patient's allergies indicates:   Allergen Reactions    Percocet [oxycodone-acetaminophen] Itching     C/o of vomiting and itching    Erythromycin Other (See Comments)     Stomach pain     Social History     Social History Narrative    Not on file     Family History   Problem  Relation Age of Onset    Cancer Mother         Skin cancer    Arthritis Mother     Skin cancer Mother     Cancer Father         melanoma    Multiple sclerosis Father     Cancer Maternal Grandmother     Breast cancer Paternal Grandmother     Cancer Paternal Grandmother     Heart disease Neg Hx     Colon cancer Neg Hx     Ovarian cancer Neg Hx     Diabetes Neg Hx     Hypertension Neg Hx          Current Outpatient Medications:     biotin 10 mg Tab, Take by mouth., Disp: , Rfl:     celecoxib (CELEBREX) 200 MG capsule, TAKE 1 CAPSULE(200 MG) BY MOUTH EVERY DAY, Disp: 30 capsule, Rfl: 0    cetirizine (ZYRTEC) 10 MG tablet, Take 10 mg by mouth daily as needed., Disp: , Rfl:     JUBLIA 10 % Macario, APPLY TO NAILS DAILY, Disp: , Rfl: 10    L.acidoph-L.rhamn-B.bif-B.long 12.9 mg (2 billion cell) TbEC, Take by mouth., Disp: , Rfl:     omeprazole (PRILOSEC) 40 MG capsule, TAKE 1 CAPSULE(40 MG) BY MOUTH EVERY DAY, Disp: 30 capsule, Rfl: 0    ondansetron (ZOFRAN-ODT) 4 MG TbDL, Take 1 tablet (4 mg total) by mouth every 6 (six) hours as needed., Disp: 30 tablet, Rfl: 0    vitamin D 1000 units Tab, Take 1,000 mg by mouth once daily., Disp: , Rfl:       Review of Systems:  Constitutional: no fever or chills  Eyes: no visual changes  ENT: no nasal congestion or sore throat  Respiratory: no cough or shortness of breath  Cardiovascular: no chest pain  Gastrointestinal: no nausea or vomiting, tolerating diet  Musculoskeletal: pain and soreness    OBJECTIVE:      Vital Signs (Most Recent):  There were no vitals filed for this visit.  There is no height or weight on file to calculate BMI.      Physical Exam:  Constitutional: The patient appears well-developed and well-nourished. No distress.   Skin: No lesions appreciated  Head: Normocephalic and atraumatic.   Nose: Nose normal.   Ears: No deformities seen  Eyes: Conjunctivae and EOM are normal.   Neck: No tracheal deviation present.   Cardiovascular: Normal rate and intact distal pulses.     Pulmonary/Chest: Effort normal. No respiratory distress.   Abdominal: There is no guarding.   Neurological: The patient is alert.   Psychiatric: The patient has a normal mood and affect.     Left Hand/Wrist Examination:    Observation/Inspection:  Swelling  none    Deformity  none  Discoloration  none     Scars   none    Atrophy  none    HAND/WRIST EXAMINATION:  Finkelstein's Test   Neg  WHAT Test    Neg  Snuff box tenderness   Neg  Henderson's Test    Neg  Hook of Hamate Tenderness  Neg  CMC grind    POS  Circumduction test   Mod pain  TTP over the CMC. NTTP at the A1 pulley or radial styloid    Neurovascular Exam:  Digits WWP, brisk CR < 3s throughout  NVI motor/LTS to M/R/U nerves, radial pulse 2+  Tinel's Test - Carpal Tunnel  Neg  Tinel's Test - Cubital Tunnel  Neg  Phalen's Test    Neg  Median Nerve Compression Test Neg    ROM hand full, painless    ROM wrist full, painless    ROM elbow full, painless    Abdomen not guarded  Respirations nonlabored  Perfusion intact    Diagnostic Results:     Imaging - I independently viewed the patient's imaging as well as the radiology report.      FINDINGS:  The alignment is within normal limits.  No displaced fractures identified.  No evidence of lytic or blastic lesions.Degenerative changes 1st carpometacarpal joint with joint space narrowing subchondral sclerosis and heterotopic ossicles.Soft tissues are unremarkable.     Impression:     No evidence of fracture.Degenerative change 1st carpometacarpal joint primarily.      ASSESSMENT/PLAN:      58 y.o. yo female with Left thumb CMC arthritis    Plan: The patient and I had a thorough discussion today.  We discussed the working diagnosis as well as several other potential alternative diagnoses.  Treatment options were discussed, both conservative and surgical.  Conservative treatment options would include things such as activity modifications, workplace modifications, a period of rest, oral vs topical OTC and prescription  anti-inflammatory medications, occupational therapy, splinting/bracing, immobilization, corticosteroid injections, and others.  Surgical options were discussed as well.     At this time, the patient would like to proceed with a trial of thumb brace for comfort, Voltaren gel massage, ice/heat. She can RTC on prn basis for steroid injection.    Should the patient's symptoms worsen, persist, or fail to improve they should return for reevaluation and I would be happy to see them back anytime.           Please do not hesitate to reach out to us via email, phone, or MyChart with any questions, concerns, or feedback.

## 2023-11-22 ENCOUNTER — OFFICE VISIT (OUTPATIENT)
Dept: INTERNAL MEDICINE | Facility: CLINIC | Age: 58
End: 2023-11-22
Payer: COMMERCIAL

## 2023-11-22 ENCOUNTER — TELEPHONE (OUTPATIENT)
Dept: INTERNAL MEDICINE | Facility: CLINIC | Age: 58
End: 2023-11-22

## 2023-11-22 VITALS
OXYGEN SATURATION: 96 % | HEIGHT: 63 IN | HEART RATE: 76 BPM | WEIGHT: 191.13 LBS | SYSTOLIC BLOOD PRESSURE: 138 MMHG | BODY MASS INDEX: 33.87 KG/M2 | DIASTOLIC BLOOD PRESSURE: 72 MMHG

## 2023-11-22 DIAGNOSIS — K21.9 GASTROESOPHAGEAL REFLUX DISEASE WITHOUT ESOPHAGITIS: ICD-10-CM

## 2023-11-22 DIAGNOSIS — E55.9 VITAMIN D DEFICIENCY: ICD-10-CM

## 2023-11-22 DIAGNOSIS — R03.0 ELEVATED BLOOD-PRESSURE READING WITHOUT DIAGNOSIS OF HYPERTENSION: ICD-10-CM

## 2023-11-22 DIAGNOSIS — E78.2 MIXED HYPERLIPIDEMIA: ICD-10-CM

## 2023-11-22 DIAGNOSIS — Z76.89 ENCOUNTER TO ESTABLISH CARE WITH NEW DOCTOR: ICD-10-CM

## 2023-11-22 DIAGNOSIS — Z23 NEED FOR VACCINATION: ICD-10-CM

## 2023-11-22 DIAGNOSIS — H81.10 BENIGN PAROXYSMAL POSITIONAL VERTIGO, UNSPECIFIED LATERALITY: ICD-10-CM

## 2023-11-22 DIAGNOSIS — Z00.01 ENCOUNTER FOR ANNUAL GENERAL MEDICAL EXAMINATION WITH ABNORMAL FINDINGS IN ADULT: Primary | ICD-10-CM

## 2023-11-22 DIAGNOSIS — L98.9 SKIN LESION OF RIGHT LEG: ICD-10-CM

## 2023-11-22 PROCEDURE — 3075F SYST BP GE 130 - 139MM HG: CPT | Mod: CPTII,S$GLB,, | Performed by: FAMILY MEDICINE

## 2023-11-22 PROCEDURE — 90686 IIV4 VACC NO PRSV 0.5 ML IM: CPT | Mod: S$GLB,,, | Performed by: FAMILY MEDICINE

## 2023-11-22 PROCEDURE — 90686 FLU VACCINE (QUAD) GREATER THAN OR EQUAL TO 3YO PRESERVATIVE FREE IM: ICD-10-PCS | Mod: S$GLB,,, | Performed by: FAMILY MEDICINE

## 2023-11-22 PROCEDURE — 1160F PR REVIEW ALL MEDS BY PRESCRIBER/CLIN PHARMACIST DOCUMENTED: ICD-10-PCS | Mod: CPTII,S$GLB,, | Performed by: FAMILY MEDICINE

## 2023-11-22 PROCEDURE — 1159F MED LIST DOCD IN RCRD: CPT | Mod: CPTII,S$GLB,, | Performed by: FAMILY MEDICINE

## 2023-11-22 PROCEDURE — G0008 ADMIN INFLUENZA VIRUS VAC: HCPCS | Mod: S$GLB,,, | Performed by: FAMILY MEDICINE

## 2023-11-22 PROCEDURE — 99999 PR PBB SHADOW E&M-EST. PATIENT-LVL IV: ICD-10-PCS | Mod: PBBFAC,,, | Performed by: FAMILY MEDICINE

## 2023-11-22 PROCEDURE — 3075F PR MOST RECENT SYSTOLIC BLOOD PRESS GE 130-139MM HG: ICD-10-PCS | Mod: CPTII,S$GLB,, | Performed by: FAMILY MEDICINE

## 2023-11-22 PROCEDURE — 3008F BODY MASS INDEX DOCD: CPT | Mod: CPTII,S$GLB,, | Performed by: FAMILY MEDICINE

## 2023-11-22 PROCEDURE — 99203 OFFICE O/P NEW LOW 30 MIN: CPT | Mod: 25,S$GLB,, | Performed by: FAMILY MEDICINE

## 2023-11-22 PROCEDURE — 3078F DIAST BP <80 MM HG: CPT | Mod: CPTII,S$GLB,, | Performed by: FAMILY MEDICINE

## 2023-11-22 PROCEDURE — 1159F PR MEDICATION LIST DOCUMENTED IN MEDICAL RECORD: ICD-10-PCS | Mod: CPTII,S$GLB,, | Performed by: FAMILY MEDICINE

## 2023-11-22 PROCEDURE — 99386 PR PREVENTIVE VISIT,NEW,40-64: ICD-10-PCS | Mod: 25,S$GLB,, | Performed by: FAMILY MEDICINE

## 2023-11-22 PROCEDURE — 1160F RVW MEDS BY RX/DR IN RCRD: CPT | Mod: CPTII,S$GLB,, | Performed by: FAMILY MEDICINE

## 2023-11-22 PROCEDURE — 3008F PR BODY MASS INDEX (BMI) DOCUMENTED: ICD-10-PCS | Mod: CPTII,S$GLB,, | Performed by: FAMILY MEDICINE

## 2023-11-22 PROCEDURE — 99386 PREV VISIT NEW AGE 40-64: CPT | Mod: 25,S$GLB,, | Performed by: FAMILY MEDICINE

## 2023-11-22 PROCEDURE — G0008 FLU VACCINE (QUAD) GREATER THAN OR EQUAL TO 3YO PRESERVATIVE FREE IM: ICD-10-PCS | Mod: S$GLB,,, | Performed by: FAMILY MEDICINE

## 2023-11-22 PROCEDURE — 99203 PR OFFICE/OUTPT VISIT, NEW, LEVL III, 30-44 MIN: ICD-10-PCS | Mod: 25,S$GLB,, | Performed by: FAMILY MEDICINE

## 2023-11-22 PROCEDURE — 99999 PR PBB SHADOW E&M-EST. PATIENT-LVL IV: CPT | Mod: PBBFAC,,, | Performed by: FAMILY MEDICINE

## 2023-11-22 PROCEDURE — 3078F PR MOST RECENT DIASTOLIC BLOOD PRESSURE < 80 MM HG: ICD-10-PCS | Mod: CPTII,S$GLB,, | Performed by: FAMILY MEDICINE

## 2023-11-22 RX ORDER — HYDROCORTISONE 25 MG/G
CREAM TOPICAL 2 TIMES DAILY
Qty: 1 EACH | Refills: 1 | Status: SHIPPED | OUTPATIENT
Start: 2023-11-22

## 2023-11-22 NOTE — TELEPHONE ENCOUNTER
----- Message from Madelyn Rosenbaum sent at 11/22/2023 12:52 PM CST -----  Contact: Self [phone]  a phone call.  Who left a message:  Self  Do they know what this is regarding:  Pt is calling because she doesn't understand why the fasting labs are schedule for toady. Please call back to advise.  Would they like a phone call back or a response via [facility]:  call back

## 2023-11-22 NOTE — PATIENT INSTRUCTIONS
Cream sent to your pharmacy. Apply to the area twice a day. If the mole gets larger or its appearance changes, stop the cream and call your dermatologist to see if you can be seen in person rather than video.    Fasting labs - results will be communicated through the patient portal    Set up your COVID booster through the pharmacy here or go to any retail pharmacy to receive it    Set up your shingles through the pharmacy here when you're ready

## 2023-11-22 NOTE — PROGRESS NOTES
Subjective:     Patient ID: Matt Prado is a 58 y.o. female.   Chief Complaint: Establish Care and Hypertension    HPI:  Patient presents to establish care.  Patient is due for annual exam and labs.    Reports she has been out of touch with primary care for a little while but wants to reestablish. Recently had biometric screening through her new health insurance which showed numerous areas for improvement. Notably, she had elevated BP, high cholesterol (LDL especially), and high BMI on this screen. She has not had fasting labs through PCP in several years.    UTD on mammogram, accomplished in April 2023, cleared for 1 year. She reports she has an appt set up with her GYN for women's health exam next month.    Also c/o unusual skin lesion/mole on her inner right thigh. Over the past week it has been itching and changed appearance. It is not red. She is concerned as she has a strong family history of skin cancer (basal cell and melanoma). She reports having two skin lesions removed when she was younger, both of which were benign nevi.    Also reporting some episodes of vertigo sx when she turns her head to the right. She feels fullness in that ear often and reports chronically having bilateral maxillary sinus pressure. No hearing loss or tinnitus. Pt does note a hx of meningioma but to date has had no concerning symptoms. It is monitored by neurosurgery.    Review of Problems & History:  Patient Active Problem List   Diagnosis    Gastroesophageal reflux disease without esophagitis    Hyperlipidemia    Family history of melanoma    Vitamin D deficiency disease    Non morbid obesity due to excess calories      Past Medical History:   Diagnosis Date    Allergy     Family history of melanoma 1/23/2014    Former light tobacco smoker: < 5 per day x 10 years 5/24/2016    GERD (gastroesophageal reflux disease)     Helicobacter pylori ab+ treated 2007 1/24/2014    Situational stress 1/24/2014    Smoker: trivial  "2015      Past Surgical History:   Procedure Laterality Date    CHOLECYSTECTOMY      COLONOSCOPY N/A 2016    Procedure: COLONOSCOPY;  Surgeon: Dionisio Meier MD;  Location: [address]);  Service: Endoscopy;  Laterality: N/A;  combined order to be done with EGD       Social History     Socioeconomic History    Marital status:     Number of children: 3   Tobacco Use    Smoking status: Former     Current packs/day: 0.00     Average packs/day: 0.3 packs/day for 10.0 years (2.5 ttl pk-yrs)     Types: Cigarettes     Start date: 2006     Quit date: 2016     Years since quittin.8    Smokeless tobacco: Never   Substance and Sexual Activity    Alcohol use: Yes     Alcohol/week: 2.0 standard drinks of alcohol     Types: 2 Glasses of wine per week     Comment: 2-4  glasses every night    Drug use: No    Sexual activity: Yes     Partners: Male     Birth control/protection: None      Health Maintenance:  Colon Cancer Screening  Previous study completed on 16  Type of Study: Colonoscopy  Findings: Normal, 10 year clearance  Lung Cancer Screening  Never smoker  Cervical Cancer Screening  Last PAP: 2018  Last PAP results: was normal  HPV Cotest: Yes - negative  Next due: due date: now  Appt scheduled w/ GYN Dec 2023  Breast Cancer Screening  Last mammogram: 5/10/23  Last mammogram results: Normal  Next due: due date: 2024  ASCVD Risk  The ASCVD Risk score (Teri FIORE, et al., 2019) failed to calculate for the following reasons:    Cannot find a previous HDL lab    Cannot find a previous total cholesterol lab   Lab Results   Component Value Date    CHOL 217 (H) 2016    HDL 61 2016    TRIG 136 2016     Statin rx: no  Diet  The patient reports no specific efforts to gain or lose weight.  in general, a "healthy" diet  , well balanced  Sexual Health  single partner, contraception - post menopausal status  Vaccines  Seasonal Influenza: Due for booster  COVID-19: Due for " booster  RSV: Not indicated  Tetanus: UTD  Shingles: Due for primary series  Pneumococcal: Not indicated    Medication Review:    Current Outpatient Medications:     cetirizine (ZYRTEC) 10 MG tablet, Take 10 mg by mouth daily as needed., Disp: , Rfl:     JUBLIA 10 % Macario, APPLY TO NAILS DAILY, Disp: , Rfl: 10    omeprazole (PRILOSEC) 40 MG capsule, TAKE 1 CAPSULE(40 MG) BY MOUTH EVERY DAY, Disp: 30 capsule, Rfl: 0    vitamin D 1000 units Tab, Take 1,000 mg by mouth once daily., Disp: , Rfl:     biotin 10 mg Tab, Take by mouth., Disp: , Rfl:     celecoxib (CELEBREX) 200 MG capsule, TAKE 1 CAPSULE(200 MG) BY MOUTH EVERY DAY (Patient not taking: Reported on 11/22/2023), Disp: 30 capsule, Rfl: 0    hydrocortisone 2.5 % cream, Apply topically 2 (two) times daily., Disp: 1 each, Rfl: 1    L.acidoph-L.rhamn-B.bif-B.long 12.9 mg (2 billion cell) TbEC, Take by mouth., Disp: , Rfl:     ondansetron (ZOFRAN-ODT) 4 MG TbDL, Take 1 tablet (4 mg total) by mouth every 6 (six) hours as needed. (Patient not taking: Reported on 11/22/2023), Disp: 30 tablet, Rfl: 0     Review of Systems   Constitutional:  Negative for chills, fatigue and fever.   HENT:  Positive for sinus pressure/congestion. Negative for nasal congestion, ear pain, postnasal drip and sore throat.    Eyes:  Negative for visual disturbance.   Respiratory:  Negative for cough, shortness of breath and wheezing.    Cardiovascular:  Negative for chest pain and palpitations.   Gastrointestinal:  Negative for abdominal pain, change in bowel habit, constipation, diarrhea, nausea and vomiting.   Genitourinary:  Negative for dysuria and hematuria.   Musculoskeletal:  Negative for back pain, leg pain and neck pain.   Integumentary:  Positive for mole/lesion.   Neurological:  Positive for dizziness and vertigo. Negative for numbness and headaches.   Hematological:  Negative for adenopathy.   Psychiatric/Behavioral:  Negative for dysphoric mood, sleep disturbance and suicidal  "ideas. The patient is not nervous/anxious.           Objective:      Vitals:    11/22/23 1115   BP: 138/72   BP Location: Right arm   Patient Position: Sitting   BP Method: Large (Manual)   Pulse: 76   SpO2: 96%   Weight: 86.7 kg (191 lb 2.2 oz)   Height: 5' 3" (1.6 m)      Physical Exam  Vitals and nursing note reviewed. Exam conducted with a chaperone present (AFRICA De Los Santos).   Constitutional:       General: She is not in acute distress.     Appearance: Normal appearance. She is obese. She is not ill-appearing.   HENT:      Head: Normocephalic and atraumatic.      Right Ear: Ear canal and external ear normal. A middle ear effusion is present. There is impacted cerumen (incompletely removed during exam). Tympanic membrane is not erythematous or bulging.      Left Ear: Ear canal and external ear normal. A middle ear effusion is present. There is no impacted cerumen. Tympanic membrane is not erythematous or bulging.      Nose: Nose normal. No congestion or rhinorrhea.      Mouth/Throat:      Mouth: Mucous membranes are moist.      Pharynx: Oropharynx is clear. No oropharyngeal exudate or posterior oropharyngeal erythema.   Eyes:      General: No scleral icterus.        Right eye: No discharge.         Left eye: No discharge.      Conjunctiva/sclera: Conjunctivae normal.   Neck:      Thyroid: No thyroid mass, thyromegaly or thyroid tenderness.   Cardiovascular:      Rate and Rhythm: Normal rate and regular rhythm.      Pulses: Normal pulses.      Heart sounds: Normal heart sounds. No murmur heard.     No friction rub. No gallop.   Pulmonary:      Effort: Pulmonary effort is normal. No respiratory distress.      Breath sounds: Normal breath sounds. No wheezing, rhonchi or rales.   Abdominal:      General: Abdomen is flat. Bowel sounds are normal.      Palpations: Abdomen is soft.      Tenderness: There is no abdominal tenderness.   Musculoskeletal:         General: No swelling or deformity. Normal range of motion. "      Cervical back: Normal range of motion and neck supple. No tenderness.   Lymphadenopathy:      Cervical: No cervical adenopathy.   Skin:     General: Skin is warm and dry.      Findings: Lesion present.             Comments: 0.5cm by 0.5cm roughly rounded/triangular shaped lesion; lesion is ringed by erythema following its borders; lesion is raised and pale in appearance; nontender on exam   Neurological:      General: No focal deficit present.      Mental Status: She is alert and oriented to person, place, and time. Mental status is at baseline.      Gait: Gait normal.      Deep Tendon Reflexes: Reflexes normal.   Psychiatric:         Mood and Affect: Mood normal.         Behavior: Behavior normal.         Thought Content: Thought content normal.         Judgment: Judgment normal.           Assessment:       Problem List Items Addressed This Visit          Cardiac/Vascular    Hyperlipidemia       GI    Gastroesophageal reflux disease without esophagitis     Other Visit Diagnoses       Encounter for annual general medical examination with abnormal findings in adult    -  Primary    Relevant Orders    Comprehensive Metabolic Panel    CBC Without Differential    Lipid Panel    Hemoglobin A1C    TSH    T4, Free    Encounter to establish care with new doctor        Skin lesion of right leg        Relevant Medications    hydrocortisone 2.5 % cream    Elevated blood-pressure reading without diagnosis of hypertension        Vitamin D deficiency        Relevant Orders    Vitamin D    Need for vaccination        Relevant Orders    Influenza - Quadrivalent (PF) (Completed)              Plan:       1. Encounter for annual general medical examination with abnormal findings in adult  Health maintenance updated  Chronic issues reviewed  Fasting labs ordered    -     Comprehensive Metabolic Panel; Future; Expected date: 11/22/2023  -     CBC Without Differential; Future; Expected date: 11/22/2023  -     Lipid Panel; Future;  Expected date: 11/22/2023  -     Hemoglobin A1C; Future; Expected date: 11/22/2023  -     TSH; Future; Expected date: 11/22/2023  -     T4, Free; Future; Expected date: 11/22/2023    2. Encounter to establish care with new doctor  Reviewed chronic medical conditions, updated problem list and medication list    3. Skin lesion of right leg  Unusual appearing lesion, good that pt has appt with dermatology set up - though she may end up needing in-person eval rather than video  To help w/ sx will send hydrocortisone cream  Advised to stop immediately if sx worsen while using cream  -     hydrocortisone 2.5 % cream; Apply topically 2 (two) times daily.  Dispense: 1 each; Refill: 1    4. Mixed hyperlipidemia  F/u lipid panel  No meds today, will reassess r/b once labs return    5. Elevated blood-pressure reading without diagnosis of hypertension  Higher end of acceptable range today, will continue to follow  No meds today    6. Gastroesophageal reflux disease without esophagitis  No change to therapy, managed well with OTC PPI, continue    7. Vitamin D deficiency  Checking levels today, continue supplementation  -     Vitamin D; Future; Expected date: 11/22/2023    8. Need for vaccination  Flu shot administered in clinic  COVID booster to be set up through pharmacy  Shingles series to be set up through pharmacy  -     Influenza - Quadrivalent (PF)    9. BPPV  Suspect eustachian tube dysfunction, sinus congestion, cerumen impaction, otolith dysfunction; less likely but still may be related to meningioma  Pt already on zyrtec  Cerumen partially cleared today as much as pt could tolerate  Info provided on Epley maneuvers today for sx relief  If no improvement, may need ENT or reeval with neurosurgery to r/o more serious causes                 Follow up in about 1 year (around 11/22/2024).     Timoteo Teran MD, FAAFP  Family Medicine Physician  [facility]  11/22/2023

## 2023-11-27 ENCOUNTER — LAB VISIT (OUTPATIENT)
Dept: LAB | Facility: HOSPITAL | Age: 58
End: 2023-11-27
Attending: FAMILY MEDICINE
Payer: COMMERCIAL

## 2023-11-27 DIAGNOSIS — Z00.01 ENCOUNTER FOR ANNUAL GENERAL MEDICAL EXAMINATION WITH ABNORMAL FINDINGS IN ADULT: ICD-10-CM

## 2023-11-27 DIAGNOSIS — E55.9 VITAMIN D DEFICIENCY: ICD-10-CM

## 2023-11-27 LAB
25(OH)D3+25(OH)D2 SERPL-MCNC: 50 NG/ML (ref 30–96)
ALBUMIN SERPL BCP-MCNC: 4.2 G/DL (ref 3.5–5.2)
ALP SERPL-CCNC: 91 U/L (ref 55–135)
ALT SERPL W/O P-5'-P-CCNC: 29 U/L (ref 10–44)
ANION GAP SERPL CALC-SCNC: 9 MMOL/L (ref 8–16)
AST SERPL-CCNC: 25 U/L (ref 10–40)
BILIRUB SERPL-MCNC: 0.5 MG/DL (ref 0.1–1)
BUN SERPL-MCNC: 21 MG/DL (ref 6–20)
CALCIUM SERPL-MCNC: 9.7 MG/DL (ref 8.7–10.5)
CHLORIDE SERPL-SCNC: 103 MMOL/L (ref 95–110)
CHOLEST SERPL-MCNC: 249 MG/DL (ref 120–199)
CHOLEST/HDLC SERPL: 4.4 {RATIO} (ref 2–5)
CO2 SERPL-SCNC: 29 MMOL/L (ref 23–29)
CREAT SERPL-MCNC: 0.8 MG/DL (ref 0.5–1.4)
ERYTHROCYTE [DISTWIDTH] IN BLOOD BY AUTOMATED COUNT: 12.8 % (ref 11.5–14.5)
EST. GFR  (NO RACE VARIABLE): >60 ML/MIN/1.73 M^2
ESTIMATED AVG GLUCOSE: 103 MG/DL (ref 68–131)
GLUCOSE SERPL-MCNC: 100 MG/DL (ref 70–110)
HBA1C MFR BLD: 5.2 % (ref 4–5.6)
HCT VFR BLD AUTO: 44.2 % (ref 37–48.5)
HDLC SERPL-MCNC: 56 MG/DL (ref 40–75)
HDLC SERPL: 22.5 % (ref 20–50)
HGB BLD-MCNC: 14.7 G/DL (ref 12–16)
LDLC SERPL CALC-MCNC: 172.4 MG/DL (ref 63–159)
MCH RBC QN AUTO: 30.7 PG (ref 27–31)
MCHC RBC AUTO-ENTMCNC: 33.3 G/DL (ref 32–36)
MCV RBC AUTO: 92 FL (ref 82–98)
NONHDLC SERPL-MCNC: 193 MG/DL
PLATELET # BLD AUTO: 172 K/UL (ref 150–450)
PMV BLD AUTO: 10.2 FL (ref 9.2–12.9)
POTASSIUM SERPL-SCNC: 4.4 MMOL/L (ref 3.5–5.1)
PROT SERPL-MCNC: 7.1 G/DL (ref 6–8.4)
RBC # BLD AUTO: 4.79 M/UL (ref 4–5.4)
SODIUM SERPL-SCNC: 141 MMOL/L (ref 136–145)
T4 FREE SERPL-MCNC: 0.87 NG/DL (ref 0.71–1.51)
TRIGL SERPL-MCNC: 103 MG/DL (ref 30–150)
TSH SERPL DL<=0.005 MIU/L-ACNC: 2.29 UIU/ML (ref 0.4–4)
WBC # BLD AUTO: 5.59 K/UL (ref 3.9–12.7)

## 2023-11-27 PROCEDURE — 36415 COLL VENOUS BLD VENIPUNCTURE: CPT | Performed by: FAMILY MEDICINE

## 2023-11-27 PROCEDURE — 84439 ASSAY OF FREE THYROXINE: CPT | Performed by: FAMILY MEDICINE

## 2023-11-27 PROCEDURE — 82306 VITAMIN D 25 HYDROXY: CPT | Performed by: FAMILY MEDICINE

## 2023-11-27 PROCEDURE — 80053 COMPREHEN METABOLIC PANEL: CPT | Performed by: FAMILY MEDICINE

## 2023-11-27 PROCEDURE — 83036 HEMOGLOBIN GLYCOSYLATED A1C: CPT | Performed by: FAMILY MEDICINE

## 2023-11-27 PROCEDURE — 80061 LIPID PANEL: CPT | Performed by: FAMILY MEDICINE

## 2023-11-27 PROCEDURE — 84443 ASSAY THYROID STIM HORMONE: CPT | Performed by: FAMILY MEDICINE

## 2023-11-27 PROCEDURE — 85027 COMPLETE CBC AUTOMATED: CPT | Performed by: FAMILY MEDICINE

## 2023-11-28 ENCOUNTER — TELEPHONE (OUTPATIENT)
Dept: OBSTETRICS AND GYNECOLOGY | Facility: CLINIC | Age: 58
End: 2023-11-28
Payer: COMMERCIAL

## 2024-01-10 ENCOUNTER — OFFICE VISIT (OUTPATIENT)
Dept: OBSTETRICS AND GYNECOLOGY | Facility: CLINIC | Age: 59
End: 2024-01-10
Payer: COMMERCIAL

## 2024-01-10 VITALS
DIASTOLIC BLOOD PRESSURE: 92 MMHG | WEIGHT: 188.69 LBS | BODY MASS INDEX: 33.43 KG/M2 | SYSTOLIC BLOOD PRESSURE: 116 MMHG

## 2024-01-10 DIAGNOSIS — Z12.31 VISIT FOR SCREENING MAMMOGRAM: ICD-10-CM

## 2024-01-10 DIAGNOSIS — Z01.419 WOMEN'S ANNUAL ROUTINE GYNECOLOGICAL EXAMINATION: Primary | ICD-10-CM

## 2024-01-10 DIAGNOSIS — Z78.0 POSTMENOPAUSAL STATUS: ICD-10-CM

## 2024-01-10 DIAGNOSIS — Z12.4 PAP SMEAR FOR CERVICAL CANCER SCREENING: ICD-10-CM

## 2024-01-10 PROCEDURE — 1160F RVW MEDS BY RX/DR IN RCRD: CPT | Mod: CPTII,S$GLB,, | Performed by: OBSTETRICS & GYNECOLOGY

## 2024-01-10 PROCEDURE — 3080F DIAST BP >= 90 MM HG: CPT | Mod: CPTII,S$GLB,, | Performed by: OBSTETRICS & GYNECOLOGY

## 2024-01-10 PROCEDURE — 3008F BODY MASS INDEX DOCD: CPT | Mod: CPTII,S$GLB,, | Performed by: OBSTETRICS & GYNECOLOGY

## 2024-01-10 PROCEDURE — 87624 HPV HI-RISK TYP POOLED RSLT: CPT | Performed by: OBSTETRICS & GYNECOLOGY

## 2024-01-10 PROCEDURE — 99999 PR PBB SHADOW E&M-EST. PATIENT-LVL III: CPT | Mod: PBBFAC,,, | Performed by: OBSTETRICS & GYNECOLOGY

## 2024-01-10 PROCEDURE — 1159F MED LIST DOCD IN RCRD: CPT | Mod: CPTII,S$GLB,, | Performed by: OBSTETRICS & GYNECOLOGY

## 2024-01-10 PROCEDURE — 99386 PREV VISIT NEW AGE 40-64: CPT | Mod: S$GLB,,, | Performed by: OBSTETRICS & GYNECOLOGY

## 2024-01-10 PROCEDURE — 3074F SYST BP LT 130 MM HG: CPT | Mod: CPTII,S$GLB,, | Performed by: OBSTETRICS & GYNECOLOGY

## 2024-01-10 PROCEDURE — 88175 CYTOPATH C/V AUTO FLUID REDO: CPT | Performed by: OBSTETRICS & GYNECOLOGY

## 2024-01-10 NOTE — PROGRESS NOTES
Matt Prado is a 58 y.o. year old  female who presents for routine GYN exam.  She is postmenopausal, not on hormones.  Denies bleeding but experiences occasional hot flashes and sweats.  No gyn complaints.  (Last seen 2018).    Blood pressure (!) 116/92, weight 85.6 kg (188 lb 11.4 oz).    5/10/2023:  Mammogram: Negative, TC: 14.32%    Past Medical History:   Diagnosis Date    Allergy     Family history of melanoma 2014    Former light tobacco smoker: < 5 per day x 10 years 2016    GERD (gastroesophageal reflux disease)     Helicobacter pylori ab+ treated 2014    Situational stress 2014    Smoker: trivial 2015       Past Surgical History:   Procedure Laterality Date    CHOLECYSTECTOMY      COLONOSCOPY N/A 2016    Procedure: COLONOSCOPY;  Surgeon: Dionisio Meier MD;  Location: [address];  Service: Endoscopy;  Laterality: N/A;  combined order to be done with EGD        OB History          4    Para   4    Term   1            AB        Living   3         SAB        IAB        Ectopic        Multiple        Live Births                       ROS:  GENERAL: Feeling well overall.   SKIN: Denies rash or lesions.   HEAD: Denies head injury or headache.   NODES: Denies enlarged lymph nodes.   CHEST: Denies chest pain or shortness of breath.   CARDIOVASCULAR: Denies palpitations or left sided chest pain.   ABDOMEN: No abdominal pain, nausea, vomiting or rectal bleeding.   URINARY: No dysuria or hematuria.  REPRODUCTIVE: See HPI.   BREASTS: Denies pain, lumps, or nipple discharge.   HEMATOLOGIC: No easy bruisability or excessive bleeding.   MUSCULOSKELETAL: Denies joint pain.  NEUROLOGIC: Denies syncope or weakness.   PSYCHIATRIC: Denies depression.     PE:   (chaperone present during entire exam)  APPEARANCE: Well nourished, well developed, in no acute distress.  BREASTS: Symmetrical, no skin changes or visible lesions. No palpable masses, nipple  discharge or adenopathy bilaterally.  ABDOMEN: Soft. No tenderness or masses. No hernias. No CVA tenderness.  VULVA: No lesions. Normal female genitalia.  URETHRAL MEATUS: Normal size and location, no lesions, no prolapse.  URETHRA: No masses, tenderness, prolapse or scarring.  VAGINA: Atrophic.  No lesions, no abnormal discharge, no significant cystocele or rectocele.  CERVIX: No lesions and discharge. PAP done.  UTERUS: Normal size, regular shape, mobile, non-tender, bladder base nontender.  ADNEXA: No masses, tenderness or CDS nodularity.  ANUS PERINEUM: Normal.    Diagnosis:  1. Women's annual routine gynecological examination    2. Postmenopausal status    3. Pap smear for cervical cancer screening    4. Visit for screening mammogram          PLAN:    Orders Placed This Encounter    HPV High Risk Genotypes, PCR    Mammo Digital Screening Bilat w/ Jose    Liquid-Based Pap Smear, Screening       Patient was counseled today on postmenopausal issues.    Follow-up in 1 year.    This note was created with voice recognition software.  Grammatical, syntax and spelling errors may be inevitable.

## 2024-01-17 ENCOUNTER — PATIENT MESSAGE (OUTPATIENT)
Dept: OBSTETRICS AND GYNECOLOGY | Facility: CLINIC | Age: 59
End: 2024-01-17
Payer: COMMERCIAL

## 2024-01-17 LAB
FINAL PATHOLOGIC DIAGNOSIS: NORMAL
Lab: NORMAL

## 2024-01-18 LAB
HPV HR 12 DNA SPEC QL NAA+PROBE: NEGATIVE
HPV16 AG SPEC QL: NEGATIVE
HPV18 DNA SPEC QL NAA+PROBE: NEGATIVE

## 2024-05-31 ENCOUNTER — HOSPITAL ENCOUNTER (OUTPATIENT)
Dept: RADIOLOGY | Facility: HOSPITAL | Age: 59
Discharge: HOME | End: 2024-05-31
Attending: OBSTETRICS & GYNECOLOGY
Payer: COMMERCIAL

## 2024-05-31 VITALS — HEIGHT: 63 IN | WEIGHT: 188 LBS | BODY MASS INDEX: 33.31 KG/M2

## 2024-05-31 DIAGNOSIS — Z12.31 VISIT FOR SCREENING MAMMOGRAM: ICD-10-CM

## 2024-05-31 PROCEDURE — 77067 SCR MAMMO BI INCL CAD: CPT | Mod: 26,,, | Performed by: RADIOLOGY

## 2024-05-31 PROCEDURE — 77063 BREAST TOMOSYNTHESIS BI: CPT | Mod: TC

## 2024-05-31 PROCEDURE — 77063 BREAST TOMOSYNTHESIS BI: CPT | Mod: 26,,, | Performed by: RADIOLOGY

## 2024-05-31 PROCEDURE — 77067 SCR MAMMO BI INCL CAD: CPT | Mod: TC

## 2024-06-07 ENCOUNTER — PATIENT MESSAGE (OUTPATIENT)
Dept: OBSTETRICS AND GYNECOLOGY | Facility: CLINIC | Age: 59
End: 2024-06-07
Payer: COMMERCIAL

## 2024-06-10 ENCOUNTER — PATIENT MESSAGE (OUTPATIENT)
Dept: INTERNAL MEDICINE | Facility: CLINIC | Age: 59
End: 2024-06-10
Payer: COMMERCIAL

## 2024-06-14 ENCOUNTER — OFFICE VISIT (OUTPATIENT)
Dept: DERMATOLOGY | Facility: CLINIC | Age: 59
End: 2024-06-14
Payer: COMMERCIAL

## 2024-06-14 DIAGNOSIS — L81.7 PIGMENTED PURPURIC DERMATOSIS: ICD-10-CM

## 2024-06-14 DIAGNOSIS — L30.9 DERMATITIS: Primary | ICD-10-CM

## 2024-06-14 PROCEDURE — 99204 OFFICE O/P NEW MOD 45 MIN: CPT | Mod: S$GLB,,, | Performed by: STUDENT IN AN ORGANIZED HEALTH CARE EDUCATION/TRAINING PROGRAM

## 2024-06-14 PROCEDURE — 99999 PR PBB SHADOW E&M-EST. PATIENT-LVL II: CPT | Mod: PBBFAC,,, | Performed by: STUDENT IN AN ORGANIZED HEALTH CARE EDUCATION/TRAINING PROGRAM

## 2024-06-14 PROCEDURE — 1159F MED LIST DOCD IN RCRD: CPT | Mod: CPTII,S$GLB,, | Performed by: STUDENT IN AN ORGANIZED HEALTH CARE EDUCATION/TRAINING PROGRAM

## 2024-06-14 PROCEDURE — 1160F RVW MEDS BY RX/DR IN RCRD: CPT | Mod: CPTII,S$GLB,, | Performed by: STUDENT IN AN ORGANIZED HEALTH CARE EDUCATION/TRAINING PROGRAM

## 2024-06-14 RX ORDER — TRIAMCINOLONE ACETONIDE 1 MG/G
CREAM TOPICAL 2 TIMES DAILY
Qty: 454 G | Refills: 1 | Status: SHIPPED | OUTPATIENT
Start: 2024-06-14

## 2024-06-14 NOTE — PROGRESS NOTES
Subjective:      Patient ID:  Matt Prado is a 58 y.o. female who presents for   Chief Complaint   Patient presents with    Spot     Patient with new are of concern:   Location: both legs   Duration:months  S/s:itch  Previous treatments: lotion     Patient with new are of concern:   Location: hands/arms  Duration:unknown  S/s:itch  Previous treatments: none       Itchy bumps on hands, legs arms. X months. Lesions come and go    Review of Systems   Skin:  Positive for itching.       Objective:   Physical Exam   Skin:   Areas Examined (abnormalities noted in diagram):   RUE Inspected  LUE Inspection Performed  RLE Inspected  LLE Inspection Performed            Diagram Legend     Erythematous scaling macule/papule c/w actinic keratosis       Vascular papule c/w angioma      Pigmented verrucoid papule/plaque c/w seborrheic keratosis      Yellow umbilicated papule c/w sebaceous hyperplasia      Irregularly shaped tan macule c/w lentigo     1-2 mm smooth white papules consistent with Milia      Movable subcutaneous cyst with punctum c/w epidermal inclusion cyst      Subcutaneous movable cyst c/w pilar cyst      Firm pink to brown papule c/w dermatofibroma      Pedunculated fleshy papule(s) c/w skin tag(s)      Evenly pigmented macule c/w junctional nevus     Mildly variegated pigmented, slightly irregular-bordered macule c/w mildly atypical nevus      Flesh colored to evenly pigmented papule c/w intradermal nevus       Pink pearly papule/plaque c/w basal cell carcinoma      Erythematous hyperkeratotic cursted plaque c/w SCC      Surgical scar with no sign of skin cancer recurrence      Open and closed comedones      Inflammatory papules and pustules      Verrucoid papule consistent consistent with wart     Erythematous eczematous patches and plaques     Dystrophic onycholytic nail with subungual debris c/w onychomycosis     Umbilicated papule    Erythematous-base heme-crusted tan verrucoid plaque consistent with  inflamed seborrheic keratosis     Erythematous Silvery Scaling Plaque c/w Psoriasis     See annotation      Assessment / Plan:        Dermatitis  - eruption somewhat nonspecific. Favor dermatitis. Counseled on dry skin care. Cerave cream bid  -     triamcinolone acetonide 0.1% (KENALOG) 0.1 % cream; Apply topically 2 (two) times daily. Use to affected areas for up to 2 weeks then take a 1 week break or decrease to 3 times weekly. Do not apply to groin or face. Use to itchy areas when needed  Dispense: 454 g; Refill: 1    Pigmented purpuric dermatosis  - compression socks   -     triamcinolone acetonide 0.1% (KENALOG) 0.1 % cream; Apply topically 2 (two) times daily. Use to affected areas for up to 2 weeks then take a 1 week break or decrease to 3 times weekly. Do not apply to groin or face. Use to itchy areas when needed  Dispense: 454 g; Refill: 1             Follow up if symptoms worsen or fail to improve.

## 2024-06-14 NOTE — PATIENT INSTRUCTIONS
XEROSIS (DRY SKIN)        Definition    Xerosis is the term for dry skin.  We all have a natural oil coating over our skin produced by the skin oil glands.  If this oil is removed, the skin becomes dry which can lead to cracking, which can lead to inflammation.  Xerosis is usually a long-term problem that recurs often, especially in the winter.    Cause    Long hot baths or showers can remove our natural oil and lead to xerosis.  One should never take more than one bath or shower a day and for no longer than ten minutes.  Use of harsh soaps such as Zest, Dial, and Ivory can worsen and cause xerosis.  Cold winter weather worsens xerosis because the amount of moisture contained in cold air is much less than the amount of moisture in warm air.    Treatment    Treatment is intended to restore the natural oil to your skin.  Keep the skin lubricated.    Do not take more than one bath or shower a day.  Use lukewarm water, not hot.  Hot water dries out the skin.    Use a gentle moisturizing soap such as Cetaphil soap, Oil of Olay, Dove, Basis, Ivory moisture care, Restoraderm cleanser.    When toweling dry, dont rub.  Blot the skin so there is still some water left on the skin.  You should apply a moisturizing cream to all of the skin such as Cerave cream, Cetaphil cream, Lipikar [place] AP+ Intense Repair Moisturizing Cream or Restoraderm or Eucerin Original Formula cream.   Alpha hydroxyacid lotions, i.e., AmLactin, also work very well for preventing dry skin, but may burn when used on inflamed or reddened skin.    If you like to swim during the winter months, you should not use soap when getting out of the pool.  When you have finished swimming, rinse off the chlorine with cool to warm water.  If this will be the only shower of the day, then you may use Cetaphil or another mild soap to cleanse your skin.  After the shower, apply a moisturizing cream to all of the skin as above.        [address] [place] [address]/ [phone] FAX/ [email or web address]

## 2025-05-29 ENCOUNTER — OFFICE VISIT (OUTPATIENT)
Dept: INTERNAL MEDICINE | Facility: CLINIC | Age: 60
End: 2025-05-29
Payer: COMMERCIAL

## 2025-05-29 ENCOUNTER — LAB VISIT (OUTPATIENT)
Dept: LAB | Facility: HOSPITAL | Age: 60
End: 2025-05-29
Payer: COMMERCIAL

## 2025-05-29 VITALS
BODY MASS INDEX: 33.59 KG/M2 | SYSTOLIC BLOOD PRESSURE: 132 MMHG | WEIGHT: 189.63 LBS | DIASTOLIC BLOOD PRESSURE: 87 MMHG | OXYGEN SATURATION: 97 % | HEART RATE: 73 BPM

## 2025-05-29 DIAGNOSIS — Z00.00 ANNUAL PHYSICAL EXAM: ICD-10-CM

## 2025-05-29 DIAGNOSIS — E55.9 VITAMIN D DEFICIENCY DISEASE: ICD-10-CM

## 2025-05-29 DIAGNOSIS — Z80.8 FAMILY HISTORY OF MELANOMA: ICD-10-CM

## 2025-05-29 DIAGNOSIS — E78.00 PURE HYPERCHOLESTEROLEMIA: ICD-10-CM

## 2025-05-29 DIAGNOSIS — K21.9 GASTROESOPHAGEAL REFLUX DISEASE WITHOUT ESOPHAGITIS: ICD-10-CM

## 2025-05-29 DIAGNOSIS — Z00.00 ANNUAL PHYSICAL EXAM: Primary | ICD-10-CM

## 2025-05-29 LAB
25(OH)D3+25(OH)D2 SERPL-MCNC: 56 NG/ML (ref 30–96)
ABSOLUTE EOSINOPHIL (OHS): 0.25 K/UL
ABSOLUTE MONOCYTE (OHS): 0.68 K/UL (ref 0.3–1)
ABSOLUTE NEUTROPHIL COUNT (OHS): 3.03 K/UL (ref 1.8–7.7)
ALBUMIN SERPL BCP-MCNC: 4.6 G/DL (ref 3.5–5.2)
ALP SERPL-CCNC: 97 UNIT/L (ref 40–150)
ALT SERPL W/O P-5'-P-CCNC: 37 UNIT/L (ref 10–44)
ANION GAP (OHS): 11 MMOL/L (ref 8–16)
AST SERPL-CCNC: 31 UNIT/L (ref 11–45)
BASOPHILS # BLD AUTO: 0.04 K/UL
BASOPHILS NFR BLD AUTO: 0.6 %
BILIRUB SERPL-MCNC: 0.4 MG/DL (ref 0.1–1)
BUN SERPL-MCNC: 18 MG/DL (ref 6–20)
CALCIUM SERPL-MCNC: 9.9 MG/DL (ref 8.7–10.5)
CHLORIDE SERPL-SCNC: 102 MMOL/L (ref 95–110)
CHOLEST SERPL-MCNC: 241 MG/DL (ref 120–199)
CHOLEST/HDLC SERPL: 4.4 {RATIO} (ref 2–5)
CO2 SERPL-SCNC: 26 MMOL/L (ref 23–29)
CREAT SERPL-MCNC: 0.7 MG/DL (ref 0.5–1.4)
EAG (OHS): 105 MG/DL (ref 68–131)
ERYTHROCYTE [DISTWIDTH] IN BLOOD BY AUTOMATED COUNT: 12.8 % (ref 11.5–14.5)
FERRITIN SERPL-MCNC: 113 NG/ML (ref 20–300)
GFR SERPLBLD CREATININE-BSD FMLA CKD-EPI: >60 ML/MIN/1.73/M2
GLUCOSE SERPL-MCNC: 80 MG/DL (ref 70–110)
HBA1C MFR BLD: 5.3 % (ref 4–5.6)
HCT VFR BLD AUTO: 46.3 % (ref 37–48.5)
HDLC SERPL-MCNC: 55 MG/DL (ref 40–75)
HDLC SERPL: 22.8 % (ref 20–50)
HGB BLD-MCNC: 15.1 GM/DL (ref 12–16)
IMM GRANULOCYTES # BLD AUTO: 0.01 K/UL (ref 0–0.04)
IMM GRANULOCYTES NFR BLD AUTO: 0.2 % (ref 0–0.5)
IRON SATN MFR SERPL: 24 % (ref 20–50)
IRON SERPL-MCNC: 89 UG/DL (ref 30–160)
LDLC SERPL CALC-MCNC: 138.8 MG/DL (ref 63–159)
LYMPHOCYTES # BLD AUTO: 2.56 K/UL (ref 1–4.8)
MCH RBC QN AUTO: 30.2 PG (ref 27–31)
MCHC RBC AUTO-ENTMCNC: 32.6 G/DL (ref 32–36)
MCV RBC AUTO: 93 FL (ref 82–98)
NONHDLC SERPL-MCNC: 186 MG/DL
NUCLEATED RBC (/100WBC) (OHS): 0 /100 WBC
PLATELET # BLD AUTO: 225 K/UL (ref 150–450)
PMV BLD AUTO: 9.9 FL (ref 9.2–12.9)
POTASSIUM SERPL-SCNC: 4 MMOL/L (ref 3.5–5.1)
PROT SERPL-MCNC: 7.6 GM/DL (ref 6–8.4)
RBC # BLD AUTO: 5 M/UL (ref 4–5.4)
RELATIVE EOSINOPHIL (OHS): 3.8 %
RELATIVE LYMPHOCYTE (OHS): 39 % (ref 18–48)
RELATIVE MONOCYTE (OHS): 10.4 % (ref 4–15)
RELATIVE NEUTROPHIL (OHS): 46 % (ref 38–73)
SODIUM SERPL-SCNC: 139 MMOL/L (ref 136–145)
TIBC SERPL-MCNC: 367 UG/DL (ref 250–450)
TRANSFERRIN SERPL-MCNC: 248 MG/DL (ref 200–375)
TRIGL SERPL-MCNC: 236 MG/DL (ref 30–150)
TSH SERPL-ACNC: 1.67 UIU/ML (ref 0.4–4)
VIT B12 SERPL-MCNC: 444 PG/ML (ref 210–950)
WBC # BLD AUTO: 6.57 K/UL (ref 3.9–12.7)

## 2025-05-29 PROCEDURE — 80053 COMPREHEN METABOLIC PANEL: CPT

## 2025-05-29 PROCEDURE — 82607 VITAMIN B-12: CPT

## 2025-05-29 PROCEDURE — 3079F DIAST BP 80-89 MM HG: CPT | Mod: CPTII,S$GLB,, | Performed by: NURSE PRACTITIONER

## 2025-05-29 PROCEDURE — 3008F BODY MASS INDEX DOCD: CPT | Mod: CPTII,S$GLB,, | Performed by: NURSE PRACTITIONER

## 2025-05-29 PROCEDURE — 1160F RVW MEDS BY RX/DR IN RCRD: CPT | Mod: CPTII,S$GLB,, | Performed by: NURSE PRACTITIONER

## 2025-05-29 PROCEDURE — 36415 COLL VENOUS BLD VENIPUNCTURE: CPT

## 2025-05-29 PROCEDURE — 84443 ASSAY THYROID STIM HORMONE: CPT

## 2025-05-29 PROCEDURE — 82728 ASSAY OF FERRITIN: CPT

## 2025-05-29 PROCEDURE — 1159F MED LIST DOCD IN RCRD: CPT | Mod: CPTII,S$GLB,, | Performed by: NURSE PRACTITIONER

## 2025-05-29 PROCEDURE — 99396 PREV VISIT EST AGE 40-64: CPT | Mod: S$GLB,,, | Performed by: NURSE PRACTITIONER

## 2025-05-29 PROCEDURE — 83540 ASSAY OF IRON: CPT

## 2025-05-29 PROCEDURE — 80061 LIPID PANEL: CPT

## 2025-05-29 PROCEDURE — 82306 VITAMIN D 25 HYDROXY: CPT

## 2025-05-29 PROCEDURE — 3075F SYST BP GE 130 - 139MM HG: CPT | Mod: CPTII,S$GLB,, | Performed by: NURSE PRACTITIONER

## 2025-05-29 PROCEDURE — 85025 COMPLETE CBC W/AUTO DIFF WBC: CPT

## 2025-05-29 PROCEDURE — 99999 PR PBB SHADOW E&M-EST. PATIENT-LVL IV: CPT | Mod: PBBFAC,,, | Performed by: NURSE PRACTITIONER

## 2025-05-29 PROCEDURE — 83036 HEMOGLOBIN GLYCOSYLATED A1C: CPT

## 2025-05-29 NOTE — PROGRESS NOTES
ANNUAL VISIT NOTE     PRESENTING HISTORY     Reason for Visit:  Annual visit.    No chief complaint on file.    History of Present Illness & ROS: Ms. Matt Prado is a 59 y.o. female.  Annual   New to me  Very pleasant lady.   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   Est'd with Dr. Teran.    Fasting for labs today.   Reports that she would like to have her screening Mammogram ordered and ok with the labs being done today.   She is not having any acute or chronic concerns at this time.     Review of Systems:   Eyes: denies visual changes at this time denies floaters   ENT: no nasal congestion or sore throat  Respiratory: no cough or shorness of breath  Cardiovascular: no chest pain or palpitations  Gastrointestinal: no nausea or vomiting, no abdominal pain or change in bowel habits  Genitourinary: no hematuria or dysuria; denies frequency  Hematologic/Lymphatic: no easy bruising or lymphadenopathy  Musculoskeletal: no arthralgias or myalgias  Neurological: no seizures or tremors  Endocrine: no heat or cold intolerance    PAST HISTORY:     Past Medical History:   Diagnosis Date    Allergy     Family history of melanoma 1/23/2014    Former light tobacco smoker: < 5 per day x 10 years 5/24/2016    GERD (gastroesophageal reflux disease)     Helicobacter pylori ab+ treated 2007 1/24/2014    Situational stress 1/24/2014    Smoker: trivial 2/27/2015       Past Surgical History:   Procedure Laterality Date    CHOLECYSTECTOMY      COLONOSCOPY N/A 9/13/2016    Procedure: COLONOSCOPY;  Surgeon: Dionisio Meier MD;  Location: [address];  Service: Endoscopy;  Laterality: N/A;  combined order to be done with EGD        Family History   Problem Relation Name Age of Onset    Cancer Mother          Skin cancer    Arthritis Mother      Skin cancer Mother      Cancer Father          melanoma    Multiple sclerosis Father      Cancer Maternal Grandmother      Breast cancer Paternal Grandmother      Cancer Paternal Grandmother       Heart disease Neg Hx      Colon cancer Neg Hx      Ovarian cancer Neg Hx      Diabetes Neg Hx      Hypertension Neg Hx         Social History     Socioeconomic History    Marital status:     Number of children: 3   Tobacco Use    Smoking status: Former     Current packs/day: 0.00     Average packs/day: 0.3 packs/day for 10.0 years (2.5 ttl pk-yrs)     Types: Cigarettes     Start date: 2006     Quit date: 2016     Years since quittin.4    Smokeless tobacco: Never   Substance and Sexual Activity    Alcohol use: Yes     Alcohol/week: 2.0 standard drinks of alcohol     Types: 2 Glasses of wine per week     Comment: 2-4  glasses every night    Drug use: No    Sexual activity: Yes     Partners: Male     Birth control/protection: None     Social Drivers of Health     Financial Resource Strain: Low Risk  (2025)    Overall Financial Resource Strain (CARDIA)     Difficulty of Paying Living Expenses: Not hard at all   Food Insecurity: No Food Insecurity (2025)    Hunger Vital Sign     Worried About Running Out of Food in the Last Year: Never true     Ran Out of Food in the Last Year: Never true   Transportation Needs: No Transportation Needs (2025)    PRAPARE - Transportation     Lack of Transportation (Medical): No     Lack of Transportation (Non-Medical): No   Physical Activity: Sufficiently Active (2025)    Exercise Vital Sign     Days of Exercise per Week: 3 days     Minutes of Exercise per Session: 50 min   Stress: No Stress Concern Present (2025)    [employer] [place] [employer] - Occupational Stress Questionnaire     Feeling of Stress : Only a little   Housing Stability: Low Risk  (2025)    Housing Stability Vital Sign     Unable to Pay for Housing in the Last Year: No     Number of Times Moved in the Last Year: 0     Homeless in the Last Year: No       MEDICATIONS & ALLERGIES:     Medications Ordered Prior to Encounter[1]     Review of patient's allergies  indicates:   Allergen Reactions    Percocet [oxycodone-acetaminophen] Itching     C/o of vomiting and itching    Erythromycin Other (See Comments)     Stomach pain       Medications Reconciliation:   I have reconciled the patient's home medications and discharge medications with the patient/family. I have updated all changes.  Refer to After-Visit Medication List.    OBJECTIVE:     Vital Signs:  There were no vitals filed for this visit.  Wt Readings from Last 3 Encounters:   05/31/24 0749 85.3 kg (188 lb)   01/10/24 0848 85.6 kg (188 lb 11.4 oz)   11/22/23 1115 86.7 kg (191 lb 2.2 oz)     There is no height or weight on file to calculate BMI.   Wt Readings from Last 3 Encounters:   05/29/25 86 kg (189 lb 9.5 oz)   05/31/24 85.3 kg (188 lb)   01/10/24 85.6 kg (188 lb 11.4 oz)     Temp Readings from Last 3 Encounters:   08/27/21 98.7 °F (37.1 °C)   11/12/20 (!) 101.2 °F (38.4 °C) (Tympanic)   05/08/20 98.1 °F (36.7 °C) (Oral)     BP Readings from Last 3 Encounters:   05/29/25 132/87   01/10/24 (!) 116/92   11/22/23 138/72     Pulse Readings from Last 3 Encounters:   05/29/25 73   11/22/23 76   12/06/22 96       Physical Exam:  General: Well developed, well nourished. No distress.  HEENT: Head is normocephalic, atraumatic; ears are normal.   Eyes: Clear conjunctiva.  Neck: Supple, symmetrical neck; trachea midline.  Lungs: Clear to auscultation bilaterally and normal respiratory effort.  Cardiovascular: Heart with regular rate and rhythm. No murmurs, gallops or rubs  Extremities: No LE edema. Pulses 2+ and symmetric.   Skin: Skin color, texture, turgor normal. No rashes.  Musculoskeletal: Normal gait.   Neurologic: No focal numbness or weakness.   Psychiatric: Not depressed.      Laboratory  Lab Results   Component Value Date    WBC 5.59 11/27/2023    HGB 14.7 11/27/2023    HCT 44.2 11/27/2023     11/27/2023    CHOL 249 (H) 11/27/2023    TRIG 103 11/27/2023    HDL 56 11/27/2023    ALT 29 11/27/2023    AST 25  11/27/2023     11/27/2023    K 4.4 11/27/2023     11/27/2023    CREATININE 0.8 11/27/2023    BUN 21 (H) 11/27/2023    CO2 29 11/27/2023    TSH 2.290 11/27/2023    HGBA1C 5.2 11/27/2023       ASSESSMENT & PLAN:     Annual: Last with Dr. Teran in 2023    Annual physical exam  -     Comprehensive Metabolic Panel; Future; Expected date: 05/29/2025  -     CBC Auto Differential; Future; Expected date: 05/29/2025  -     Lipid Panel; Future; Expected date: 05/29/2025  -     Hemoglobin A1C; Future; Expected date: 05/29/2025  -     TSH; Future; Expected date: 05/29/2025  -     Vitamin D; Future; Expected date: 05/29/2025  -     Mammo Digital Screening Bilat w/ Jose (XPD); Future; Expected date: 05/29/2025  -     Iron and TIBC; Future; Expected date: 05/29/2025  -     Ferritin; Future; Expected date: 05/29/2025  -     Vitamin B12; Future; Expected date: 05/29/2025    Pure hypercholesterolemia  *Noted in 11/2023; not currently on cholesterol lowering agents  -     Lipid Panel; Future; Expected date: 05/29/2025    Vitamin D deficiency disease  -     Vitamin D; Future; Expected date: 05/29/2025    Gastroesophageal reflux disease without esophagitis  ` PPI     Family history of melanoma  ` refer to Dermatology for annual    *Needs PCP follow up in 6 months.    Future Appointments   Date Time Provider Department Center   7/1/2025  7:15 AM [id number]        Medication List            Accurate as of May 29, 2025  4:17 PM. If you have any questions, ask your nurse or doctor.                CONTINUE taking these medications      biotin 10 mg Tab     cetirizine 10 MG tablet  Commonly known as: ZYRTEC     JUBLIA 10 % Heide  Generic drug: efinaconazole     L.acidoph-L.rhamn-B.bif-B.long 12.9 mg (2 billion cell) Tbec     omeprazole 40 MG capsule  Commonly known as: PRILOSEC  TAKE 1 CAPSULE(40 MG) BY MOUTH EVERY DAY     vitamin D 1000 units Tab  Commonly known as: VITAMIN D3            STOP taking  these medications      celecoxib 200 MG capsule  Commonly known as: CeleBREX  Stopped by: ANGELIA Wing     hydrocortisone 2.5 % cream  Stopped by: ANGELIA Wing     ondansetron 4 MG Tbdl  Commonly known as: ZOFRAN-ODT  Stopped by: ANGELIA Wing     triamcinolone acetonide 0.1% 0.1 % cream  Commonly known as: KENALOG  Stopped by: ANGELIA Wing            Signing Physician:  ANGELIA Wing           [1]   Current Outpatient Medications on File Prior to Visit   Medication Sig Dispense Refill    biotin 10 mg Tab Take by mouth.      celecoxib (CELEBREX) 200 MG capsule TAKE 1 CAPSULE(200 MG) BY MOUTH EVERY DAY (Patient not taking: Reported on 11/22/2023) 30 capsule 0    cetirizine (ZYRTEC) 10 MG tablet Take 10 mg by mouth daily as needed.      hydrocortisone 2.5 % cream Apply topically 2 (two) times daily. (Patient not taking: Reported on 1/10/2024) 1 each 1    JUBLIA 10 % Macario APPLY TO NAILS DAILY  10    L.acidoph-L.rhamn-B.bif-B.long 12.9 mg (2 billion cell) TbEC Take by mouth.      omeprazole (PRILOSEC) 40 MG capsule TAKE 1 CAPSULE(40 MG) BY MOUTH EVERY DAY 30 capsule 0    ondansetron (ZOFRAN-ODT) 4 MG TbDL Take 1 tablet (4 mg total) by mouth every 6 (six) hours as needed. (Patient not taking: Reported on 11/22/2023) 30 tablet 0    triamcinolone acetonide 0.1% (KENALOG) 0.1 % cream Apply topically 2 (two) times daily. Use to affected areas for up to 2 weeks then take a 1 week break or decrease to 3 times weekly. Do not apply to groin or face. Use to itchy areas when needed 454 g 1    vitamin D 1000 units Tab Take 1,000 mg by mouth once daily.       No current facility-administered medications on file prior to visit.

## 2025-05-30 ENCOUNTER — RESULTS FOLLOW-UP (OUTPATIENT)
Dept: INTERNAL MEDICINE | Facility: CLINIC | Age: 60
End: 2025-05-30

## 2025-07-01 ENCOUNTER — HOSPITAL ENCOUNTER (OUTPATIENT)
Dept: RADIOLOGY | Facility: HOSPITAL | Age: 60
Discharge: HOME | End: 2025-07-01
Attending: NURSE PRACTITIONER
Payer: COMMERCIAL

## 2025-07-01 VITALS — HEIGHT: 63 IN | BODY MASS INDEX: 33.66 KG/M2 | WEIGHT: 190 LBS

## 2025-07-01 DIAGNOSIS — E55.9 VITAMIN D DEFICIENCY DISEASE: ICD-10-CM

## 2025-07-01 DIAGNOSIS — Z00.00 ANNUAL PHYSICAL EXAM: ICD-10-CM

## 2025-07-01 DIAGNOSIS — E78.00 PURE HYPERCHOLESTEROLEMIA: ICD-10-CM

## 2025-07-01 DIAGNOSIS — K21.9 GASTROESOPHAGEAL REFLUX DISEASE WITHOUT ESOPHAGITIS: ICD-10-CM

## 2025-07-01 DIAGNOSIS — Z80.8 FAMILY HISTORY OF MELANOMA: ICD-10-CM

## 2025-07-01 PROCEDURE — 77063 BREAST TOMOSYNTHESIS BI: CPT | Mod: TC

## 2025-09-02 ENCOUNTER — OFFICE VISIT (OUTPATIENT)
Dept: INTERNAL MEDICINE | Facility: CLINIC | Age: 60
End: 2025-09-02
Payer: COMMERCIAL

## 2025-09-02 VITALS — OXYGEN SATURATION: 97 % | HEIGHT: 63 IN | HEART RATE: 77 BPM | BODY MASS INDEX: 34.53 KG/M2 | WEIGHT: 194.88 LBS

## 2025-09-02 DIAGNOSIS — R42 DISEQUILIBRIUM: Primary | ICD-10-CM

## 2025-09-02 DIAGNOSIS — B35.1 ONYCHOMYCOSIS: ICD-10-CM

## 2025-09-02 DIAGNOSIS — E66.09 CLASS 1 OBESITY DUE TO EXCESS CALORIES WITH SERIOUS COMORBIDITY AND BODY MASS INDEX (BMI) OF 34.0 TO 34.9 IN ADULT: ICD-10-CM

## 2025-09-02 DIAGNOSIS — E78.2 MODERATE MIXED HYPERLIPIDEMIA NOT REQUIRING STATIN THERAPY: ICD-10-CM

## 2025-09-02 DIAGNOSIS — E66.811 CLASS 1 OBESITY DUE TO EXCESS CALORIES WITH SERIOUS COMORBIDITY AND BODY MASS INDEX (BMI) OF 34.0 TO 34.9 IN ADULT: ICD-10-CM

## 2025-09-02 PROCEDURE — 1159F MED LIST DOCD IN RCRD: CPT | Mod: CPTII,S$GLB,, | Performed by: FAMILY MEDICINE

## 2025-09-02 PROCEDURE — G2211 COMPLEX E/M VISIT ADD ON: HCPCS | Mod: S$GLB,,, | Performed by: FAMILY MEDICINE

## 2025-09-02 PROCEDURE — 99214 OFFICE O/P EST MOD 30 MIN: CPT | Mod: S$GLB,,, | Performed by: FAMILY MEDICINE

## 2025-09-02 PROCEDURE — 99999 PR PBB SHADOW E&M-EST. PATIENT-LVL IV: CPT | Mod: PBBFAC,,, | Performed by: FAMILY MEDICINE

## 2025-09-02 PROCEDURE — 3044F HG A1C LEVEL LT 7.0%: CPT | Mod: CPTII,S$GLB,, | Performed by: FAMILY MEDICINE

## 2025-09-02 PROCEDURE — 1160F RVW MEDS BY RX/DR IN RCRD: CPT | Mod: CPTII,S$GLB,, | Performed by: FAMILY MEDICINE

## 2025-09-02 PROCEDURE — 3008F BODY MASS INDEX DOCD: CPT | Mod: CPTII,S$GLB,, | Performed by: FAMILY MEDICINE

## 2025-09-02 RX ORDER — EFINACONAZOLE 100 MG/ML
1 SOLUTION TOPICAL DAILY
Qty: 8 ML | Refills: 10 | Status: SHIPPED | OUTPATIENT
Start: 2025-09-02